# Patient Record
Sex: FEMALE | Race: WHITE | NOT HISPANIC OR LATINO | Employment: UNEMPLOYED | URBAN - NONMETROPOLITAN AREA
[De-identification: names, ages, dates, MRNs, and addresses within clinical notes are randomized per-mention and may not be internally consistent; named-entity substitution may affect disease eponyms.]

---

## 2017-11-01 ENCOUNTER — TRANSCRIBE ORDERS (OUTPATIENT)
Dept: ADMINISTRATIVE | Facility: HOSPITAL | Age: 37
End: 2017-11-01

## 2017-11-01 DIAGNOSIS — Z12.31 VISIT FOR SCREENING MAMMOGRAM: Primary | ICD-10-CM

## 2017-11-02 ENCOUNTER — HOSPITAL ENCOUNTER (OUTPATIENT)
Dept: MAMMOGRAPHY | Facility: HOSPITAL | Age: 37
Discharge: HOME | End: 2017-11-02
Admitting: PHYSICIAN ASSISTANT

## 2017-11-02 DIAGNOSIS — Z12.31 VISIT FOR SCREENING MAMMOGRAM: ICD-10-CM

## 2017-11-02 PROCEDURE — 77067 SCR MAMMO BI INCL CAD: CPT | Performed by: RADIOLOGY

## 2017-11-02 PROCEDURE — G0202 SCR MAMMO BI INCL CAD: HCPCS

## 2017-11-02 PROCEDURE — 77063 BREAST TOMOSYNTHESIS BI: CPT | Performed by: RADIOLOGY

## 2017-11-02 PROCEDURE — 77063 BREAST TOMOSYNTHESIS BI: CPT

## 2018-02-27 ENCOUNTER — HOSPITAL ENCOUNTER (OUTPATIENT)
Dept: CARDIOLOGY | Facility: HOSPITAL | Age: 38
Discharge: HOME | End: 2018-02-27

## 2018-02-27 ENCOUNTER — HOSPITAL ENCOUNTER (OUTPATIENT)
Dept: CARDIOLOGY | Facility: HOSPITAL | Age: 38
Discharge: HOME | End: 2018-02-27
Admitting: NURSE PRACTITIONER

## 2018-02-27 ENCOUNTER — OFFICE VISIT (OUTPATIENT)
Dept: CARDIOLOGY | Facility: HOSPITAL | Age: 38
End: 2018-02-27

## 2018-02-27 VITALS
OXYGEN SATURATION: 100 % | BODY MASS INDEX: 37.18 KG/M2 | RESPIRATION RATE: 19 BRPM | HEART RATE: 76 BPM | WEIGHT: 251 LBS | DIASTOLIC BLOOD PRESSURE: 73 MMHG | HEIGHT: 69 IN | TEMPERATURE: 97.7 F | SYSTOLIC BLOOD PRESSURE: 122 MMHG

## 2018-02-27 DIAGNOSIS — R55 SYNCOPE, UNSPECIFIED SYNCOPE TYPE: ICD-10-CM

## 2018-02-27 DIAGNOSIS — R55 SYNCOPE, UNSPECIFIED SYNCOPE TYPE: Primary | ICD-10-CM

## 2018-02-27 DIAGNOSIS — R00.2 PALPITATION: ICD-10-CM

## 2018-02-27 DIAGNOSIS — R42 DIZZINESS: ICD-10-CM

## 2018-02-27 PROBLEM — K21.9 GERD (GASTROESOPHAGEAL REFLUX DISEASE): Status: ACTIVE | Noted: 2018-02-27

## 2018-02-27 PROBLEM — D17.9 LIPOMA: Status: ACTIVE | Noted: 2018-02-27

## 2018-02-27 PROBLEM — F41.9 ANXIETY: Status: ACTIVE | Noted: 2018-02-27

## 2018-02-27 PROBLEM — I49.3 PVC'S (PREMATURE VENTRICULAR CONTRACTIONS): Status: ACTIVE | Noted: 2018-02-27

## 2018-02-27 PROBLEM — E03.9 ACQUIRED HYPOTHYROIDISM: Status: ACTIVE | Noted: 2018-02-27

## 2018-02-27 PROBLEM — E78.2 MIXED HYPERLIPIDEMIA: Status: ACTIVE | Noted: 2018-02-27

## 2018-02-27 PROCEDURE — 93005 ELECTROCARDIOGRAM TRACING: CPT | Performed by: NURSE PRACTITIONER

## 2018-02-27 PROCEDURE — 99214 OFFICE O/P EST MOD 30 MIN: CPT | Performed by: NURSE PRACTITIONER

## 2018-02-27 PROCEDURE — 93270 REMOTE 30 DAY ECG REV/REPORT: CPT

## 2018-02-27 PROCEDURE — 93010 ELECTROCARDIOGRAM REPORT: CPT | Performed by: INTERNAL MEDICINE

## 2018-02-27 RX ORDER — VILAZODONE HYDROCHLORIDE 20 MG/1
20 TABLET ORAL DAILY
COMMUNITY
Start: 2018-02-08 | End: 2019-04-15

## 2018-02-27 RX ORDER — ATENOLOL 25 MG/1
12.5 TABLET ORAL DAILY
COMMUNITY
Start: 2018-02-02 | End: 2018-04-12

## 2018-02-27 RX ORDER — HYDROXYZINE PAMOATE 25 MG/1
25 CAPSULE ORAL EVERY 6 HOURS
COMMUNITY
Start: 2018-02-02 | End: 2019-04-15

## 2018-02-27 RX ORDER — IMIPRAMINE HYDROCHLORIDE 10 MG/1
10 TABLET, FILM COATED ORAL 2 TIMES DAILY
COMMUNITY
Start: 2018-02-02

## 2018-02-27 RX ORDER — PANTOPRAZOLE SODIUM 40 MG/1
40 TABLET, DELAYED RELEASE ORAL DAILY
COMMUNITY
Start: 2018-02-02 | End: 2018-04-30

## 2018-02-27 RX ORDER — LEVOTHYROXINE SODIUM 0.05 MG/1
50 TABLET ORAL DAILY
COMMUNITY
Start: 2018-02-07

## 2018-02-27 RX ORDER — ATORVASTATIN CALCIUM 20 MG/1
20 TABLET, FILM COATED ORAL DAILY
COMMUNITY
Start: 2018-02-02

## 2018-02-27 RX ORDER — GABAPENTIN 600 MG/1
800 TABLET ORAL 3 TIMES DAILY
COMMUNITY
Start: 2017-12-30

## 2018-02-27 NOTE — PROGRESS NOTES
Encounter Date:02/27/2018      Patient ID: Vidhya Park is a 37 y.o. female.        Subjective:     Chief Complaint: Establish Care   palpitations, syncope    History of Present Illness patient [facility] today for ongoing evaluation of her recent syncope.She notes that she had a tilt table a few years ago, data deficient. She notes that she did pass out during the test. She notes that she was started on atenolol by Dr Crowley at that time. She notes she has experienced intermittent palpitations since age 11. She reports an echo and holter from 2 years ago, data deficient. She notes generalized weakness and intermittent right sided chest pain. She notes that the chest pain only occurs on her right side and it does not radiate. She also reports dyspnea on exertion that improves with rest especially when walking on uneven ground.   Patient notes that she experienced a syncopal spell roughly 2 weeks ago. She notes that she was sitting at the table and began to experience nausea. She notes that she stepped outside to get some air and notes that she passed out. Per her , he found her lying on the porch and notes that it had been less than 1 minute from the time she walked outside. She notes associated blurry vision, diaphoresis, and tinnitus prior to her syncopal spell.   She also reports an increase in palpitations but reports she was started on synthroid roughly one month ago.   She also reports unilateral swelling in right arm and right leg that has present intermittently for the last year. She notes that her right arm and leg will swell when she is up frequently and then it improves with rest. She denies any injury or trauma to the right arm or leg.   She reports that her blood pressure usually runs 90/78.   She reports dizziness with position changes or when she has been standing for long periods.    Patient Active Problem List   Diagnosis   • Palpitation   • Dizziness   •  Syncope   • Acquired hypothyroidism   • Lipoma   • Anxiety   • GERD (gastroesophageal reflux disease)   • PVC's (premature ventricular contractions)   • Mixed hyperlipidemia     Past Surgical History:   Procedure Laterality Date   • CHOLECYSTECTOMY     • CYST REMOVAL      back,   • HYSTERECTOMY         No Known Allergies      Current Outpatient Prescriptions:   •  atenolol (TENORMIN) 25 MG tablet, Take 25 mg by mouth Daily., Disp: , Rfl:   •  atorvastatin (LIPITOR) 20 MG tablet, Take 20 mg by mouth Daily., Disp: , Rfl:   •  gabapentin (NEURONTIN) 600 MG tablet, Take 600 mg by mouth 3 (Three) Times a Day., Disp: , Rfl:   •  hydrOXYzine (VISTARIL) 25 MG capsule, Take 25 mg by mouth Every 6 (Six) Hours., Disp: , Rfl:   •  imipramine (TOFRANIL) 10 MG tablet, Take 10 mg by mouth 2 (Two) Times a Day., Disp: , Rfl:   •  levothyroxine (SYNTHROID, LEVOTHROID) 50 MCG tablet, Take 50 mcg by mouth Daily., Disp: , Rfl:   •  pantoprazole (PROTONIX) 40 MG EC tablet, Take 40 mg by mouth Daily., Disp: , Rfl:   •  VIIBRYD 20 MG tablet tablet, Take 20 mg by mouth Daily., Disp: , Rfl:     The following portions of the chart were reviewed and updated as appropriate: Allergies, current medications, past family history, social history, past medical history.     Review of Systems   Constitution: Positive for weakness and weight gain. Negative for chills, decreased appetite, diaphoresis, fever, malaise/fatigue, night sweats and weight loss.   HENT: Negative for congestion, hearing loss, hoarse voice and nosebleeds.    Eyes: Negative for blurred vision, visual disturbance and visual halos.   Cardiovascular: Positive for chest pain, dyspnea on exertion, leg swelling, palpitations and syncope. Negative for claudication, cyanosis, irregular heartbeat, near-syncope, orthopnea and paroxysmal nocturnal dyspnea.   Respiratory: Positive for shortness of breath and sleep disturbances due to breathing. Negative for cough, hemoptysis, snoring, sputum  "production and wheezing.    Hematologic/Lymphatic: Negative for bleeding problem. Does not bruise/bleed easily.   Skin: Negative for dry skin, itching and rash.   Musculoskeletal: Negative for arthritis, joint pain, joint swelling and myalgias.   Gastrointestinal: Positive for heartburn and nausea. Negative for bloating, abdominal pain, constipation, diarrhea, flatus, hematemesis, hematochezia, melena and vomiting.   Genitourinary: Negative for dysuria, frequency, hematuria, nocturia and urgency.   Neurological: Positive for dizziness and headaches. Negative for excessive daytime sleepiness, light-headedness and loss of balance.   Psychiatric/Behavioral: Negative for depression. The patient does not have insomnia and is not nervous/anxious.            Objective:     Vitals:    02/27/18 1008 02/27/18 1011 02/27/18 1012   BP: 117/70 115/75 122/73   BP Location: Right arm Left arm Left arm   Patient Position: Sitting Sitting Standing   Cuff Size: Adult     Pulse: 69 76 76   Resp: 19     Temp: 97.7 °F (36.5 °C)     TempSrc: Temporal Artery      SpO2: 100%     Weight: 114 kg (251 lb)     Height: 175.3 cm (69\")           Physical Exam   Constitutional: She is oriented to person, place, and time. She appears well-developed and well-nourished. She is active and cooperative. No distress.   HENT:   Head: Normocephalic and atraumatic.   Mouth/Throat: Oropharynx is clear and moist.   Eyes: Conjunctivae and EOM are normal. Pupils are equal, round, and reactive to light.   Neck: Normal range of motion. Neck supple. No JVD present. No tracheal deviation present. No thyromegaly present.   Cardiovascular: Normal rate, regular rhythm, normal heart sounds and intact distal pulses.    Pulmonary/Chest: Effort normal and breath sounds normal.   Abdominal: Soft. Bowel sounds are normal. She exhibits no distension. There is no tenderness.   Musculoskeletal: Normal range of motion.   Neurological: She is alert and oriented to person, place, " and time.   Skin: Skin is warm, dry and intact.   Psychiatric: She has a normal mood and affect. Her behavior is normal.   Nursing note and vitals reviewed.      Lab and Diagnostic Review:      EKG today: NSR at 72 bpm, nonspecific T wave abnormality  1/31/2018: WBC 10.6, RBC 4.52, hemoglobin 12.4, hematocrit 39.2, platelets 256, glucose 85, BUN 17, creatinine 0.69, estimated , sodium 138, potassium 4.7, chloride 98, carbon dioxide 22, calcium 9.6, total protein 7.1, albumin 4.0, total bilirubin 0.7, alkaline phosphatase 118, AST 27, ALT 15, total cholesterol 201, triglycerides 276, HDL 41, , TSH 6.630, vitamin d 23.5    Assessment and Plan:         1. Syncope, unspecified syncope type  Will request tilt, echo and holter from 2 years ago from Dr Crowley  - Cardiac Event Monitor; Future  Will only order new echo if abnormalities noted  2. Palpitation  Continue atenolol   - ECG 12 Lead; Future  - Cardiac Event Monitor; Future    3. Dizziness    - Cardiac Event Monitor; Future    Further treatment plan pending results of event monitor  Will request records from Dr Crowley from 2 years ago    It has been a pleasure to participate in the care of this patient.  Patient was instructed to call the [facility] with any questions, concerns, or worsening symptoms.    * Please note that portions of this note were completed with a voice recognition program. Efforts were made to edit the dictation but occasionally words are transcribed.

## 2018-04-12 ENCOUNTER — OFFICE VISIT (OUTPATIENT)
Dept: CARDIOLOGY | Facility: HOSPITAL | Age: 38
End: 2018-04-12

## 2018-04-12 ENCOUNTER — HOSPITAL ENCOUNTER (OUTPATIENT)
Dept: CARDIOLOGY | Facility: HOSPITAL | Age: 38
Discharge: HOME | End: 2018-04-12
Admitting: NURSE PRACTITIONER

## 2018-04-12 VITALS
DIASTOLIC BLOOD PRESSURE: 78 MMHG | HEIGHT: 69 IN | BODY MASS INDEX: 37.5 KG/M2 | TEMPERATURE: 98 F | RESPIRATION RATE: 16 BRPM | WEIGHT: 253.2 LBS | SYSTOLIC BLOOD PRESSURE: 114 MMHG | HEART RATE: 89 BPM | OXYGEN SATURATION: 100 %

## 2018-04-12 DIAGNOSIS — R55 SYNCOPE, UNSPECIFIED SYNCOPE TYPE: ICD-10-CM

## 2018-04-12 DIAGNOSIS — R00.0 SINUS TACHYCARDIA: ICD-10-CM

## 2018-04-12 DIAGNOSIS — R09.89 LABILE BLOOD PRESSURE: Primary | ICD-10-CM

## 2018-04-12 DIAGNOSIS — R00.2 PALPITATIONS: ICD-10-CM

## 2018-04-12 PROCEDURE — 93786 AMBL BP MNTR W/SW REC ONLY: CPT

## 2018-04-12 PROCEDURE — 99214 OFFICE O/P EST MOD 30 MIN: CPT | Performed by: NURSE PRACTITIONER

## 2018-04-12 RX ORDER — BISOPROLOL FUMARATE 5 MG/1
TABLET, FILM COATED ORAL
Qty: 30 TABLET | Refills: 5 | Status: SHIPPED | OUTPATIENT
Start: 2018-04-12 | End: 2018-04-30

## 2018-04-12 NOTE — PROGRESS NOTES
Encounter Date:04/12/2018      Patient ID: Vidhya Park is a 37 y.o. female.        Subjective:     Chief Complaint: Follow-up   syncope  History of Present Illness patient presents to the office today for ongoing evaluation of her syncope and palpitations. Patient no further syncopal spells since last office visit at the end of February. She notes that she experiences presyncope multiple times a day. She notes that her PCP recently cut her atenolol in half due to low blood pressures. She notes that her blood pressure can be 130/110 and then an hour later it will be 90/62. A tilt table April 2016 was negative. No hx of echo or stress.  She notes ongoing weakness, fatigue, dyspnea and intermittent pedal edema.   Patient Active Problem List   Diagnosis   • Palpitation   • Dizziness   • Syncope   • Acquired hypothyroidism   • Lipoma   • Anxiety   • GERD (gastroesophageal reflux disease)   • PVC's (premature ventricular contractions)   • Mixed hyperlipidemia       Past Surgical History:   Procedure Laterality Date   • CHOLECYSTECTOMY     • CYST REMOVAL      back,   • HYSTERECTOMY         No Known Allergies      Current Outpatient Prescriptions:   •  atorvastatin (LIPITOR) 20 MG tablet, Take 20 mg by mouth Daily., Disp: , Rfl:   •  gabapentin (NEURONTIN) 600 MG tablet, Take 600 mg by mouth 3 (Three) Times a Day., Disp: , Rfl:   •  hydrOXYzine (VISTARIL) 25 MG capsule, Take 25 mg by mouth Every 6 (Six) Hours., Disp: , Rfl:   •  imipramine (TOFRANIL) 10 MG tablet, Take 10 mg by mouth 2 (Two) Times a Day., Disp: , Rfl:   •  levothyroxine (SYNTHROID, LEVOTHROID) 50 MCG tablet, Take 50 mcg by mouth Daily., Disp: , Rfl:   •  pantoprazole (PROTONIX) 40 MG EC tablet, Take 40 mg by mouth Daily., Disp: , Rfl:   •  VIIBRYD 20 MG tablet tablet, Take 20 mg by mouth Daily., Disp: , Rfl:   •  bisoprolol (ZEBeta) 5 MG tablet, 1/2 tab po qd, Disp: 30 tablet, Rfl: 5    The following portions of the chart were reviewed and updated as  "appropriate: Allergies, current medications, past family history, social history, past medical history.     Review of Systems   Constitution: Positive for weakness, malaise/fatigue and weight gain. Negative for chills, decreased appetite, diaphoresis, fever, night sweats and weight loss.   HENT: Negative for congestion, hearing loss, hoarse voice and nosebleeds.    Eyes: Negative for blurred vision, visual disturbance and visual halos.   Cardiovascular: Positive for dyspnea on exertion, leg swelling and near-syncope. Negative for chest pain, claudication, cyanosis, irregular heartbeat, orthopnea, palpitations, paroxysmal nocturnal dyspnea and syncope.   Respiratory: Positive for shortness of breath and snoring. Negative for cough, hemoptysis, sleep disturbances due to breathing, sputum production and wheezing.    Hematologic/Lymphatic: Negative for bleeding problem. Does not bruise/bleed easily.   Skin: Negative for dry skin, itching and rash.   Musculoskeletal: Positive for joint pain, muscle cramps and muscle weakness. Negative for arthritis, falls, joint swelling and myalgias.   Gastrointestinal: Positive for heartburn and nausea. Negative for bloating, abdominal pain, constipation, diarrhea, flatus, hematemesis, hematochezia, melena and vomiting.   Genitourinary: Negative for dysuria, frequency, hematuria, nocturia and urgency.   Neurological: Positive for excessive daytime sleepiness, dizziness, headaches, light-headedness and loss of balance.   Psychiatric/Behavioral: Positive for depression. The patient has insomnia and is nervous/anxious.            Objective:     Vitals:    04/12/18 1102 04/12/18 1103 04/12/18 1104   BP: 117/73 111/71 114/78   BP Location: Right arm Left arm Left arm   Patient Position: Sitting Sitting Standing   Pulse: 75  89   Resp: 16     Temp: 98 °F (36.7 °C)     TempSrc: Temporal Artery      SpO2: 100%     Weight: 115 kg (253 lb 3.2 oz)     Height: 175.3 cm (69.02\")           Physical " Exam   Constitutional: She is oriented to person, place, and time. She appears well-developed and well-nourished. She is active and cooperative. No distress.   HENT:   Head: Normocephalic and atraumatic.   Mouth/Throat: Oropharynx is clear and moist.   Eyes: Conjunctivae and EOM are normal. Pupils are equal, round, and reactive to light.   Neck: Normal range of motion. Neck supple. No JVD present. No tracheal deviation present. No thyromegaly present.   Cardiovascular: Normal rate, regular rhythm, normal heart sounds and intact distal pulses.    Pulmonary/Chest: Effort normal and breath sounds normal.   Abdominal: Soft. Bowel sounds are normal. She exhibits no distension. There is no tenderness.   Musculoskeletal: Normal range of motion.   Neurological: She is alert and oriented to person, place, and time.   Skin: Skin is warm, dry and intact.   Psychiatric: She has a normal mood and affect. Her behavior is normal.   Nursing note and vitals reviewed.      Lab and Diagnostic Review:      [facility]: April 2016 WNL  30 day event monitor : worn for 24 days, PAC/PVC burden less than 1%  Average hr 78 bpm, maximum 157 bpm (sinus tachycardia)    Assessment and Plan:         1. Labile blood pressure  Patient has been having hypotension even on atenolol 12.5mg once a day   Begin bisoprolol 2. 5mg once a day  - 24 Hour Blood Pressure Monitor  - Ambulatory Referral to Cardiology Dr Thomas in [place]    2. Palpitations    - Adult Transthoracic Echo Complete W/ Cont if Necessary Per Protocol; Future  - Ambulatory Referral to Cardiology    3. Syncope, unspecified syncope type  Suspect orthostatic hypotension   - Adult Transthoracic Echo Complete W/ Cont if Necessary Per Protocol; Future  - Ambulatory Referral to Cardiology  4. Sinus tachycardia  Recent event showed no arrhthymias but hr as high as 157 in absence of exercise  D/c atenolol due to episodic hypotension  Begin bisoprolol 2. 5mg once daily    It has been a  pleasure to participate in the care of this patient.  Patient was instructed to call the [facility] with any questions, concerns, or worsening symptoms.  * Please note that portions of this note were completed with a voice recognition program. Efforts were made to edit the dictation but occasionally words are transcribed.

## 2018-04-12 NOTE — PATIENT INSTRUCTIONS
Please wear bp monitor for 24 hours  Start bisoprolol 2. 5 mg once a day in place of atenolol  You will be called with your appt for Cardiology and your echo

## 2018-04-26 ENCOUNTER — HOSPITAL ENCOUNTER (OUTPATIENT)
Dept: CARDIOLOGY | Facility: HOSPITAL | Age: 38
Discharge: HOME | End: 2018-04-26
Admitting: NURSE PRACTITIONER

## 2018-04-26 DIAGNOSIS — R00.2 PALPITATIONS: ICD-10-CM

## 2018-04-26 DIAGNOSIS — R55 SYNCOPE, UNSPECIFIED SYNCOPE TYPE: ICD-10-CM

## 2018-04-26 PROCEDURE — 93306 TTE W/DOPPLER COMPLETE: CPT | Performed by: INTERNAL MEDICINE

## 2018-04-26 PROCEDURE — 93306 TTE W/DOPPLER COMPLETE: CPT

## 2018-04-27 LAB
BH CV ECHO MEAS - % IVS THICK: -16.1 %
BH CV ECHO MEAS - % LVPW THICK: 23 %
BH CV ECHO MEAS - ACS: 2.1 CM
BH CV ECHO MEAS - AO MAX PG: 6 MMHG
BH CV ECHO MEAS - AO MEAN PG: 2.9 MMHG
BH CV ECHO MEAS - AO ROOT AREA (BSA CORRECTED): 1.4
BH CV ECHO MEAS - AO ROOT AREA: 7.8 CM^2
BH CV ECHO MEAS - AO ROOT DIAM: 3.2 CM
BH CV ECHO MEAS - AO V2 MAX: 122.2 CM/SEC
BH CV ECHO MEAS - AO V2 MEAN: 74.8 CM/SEC
BH CV ECHO MEAS - AO V2 VTI: 24 CM
BH CV ECHO MEAS - BSA(HAYCOCK): 2.4 M^2
BH CV ECHO MEAS - BSA: 2.3 M^2
BH CV ECHO MEAS - BZI_BMI: 37.4 KILOGRAMS/M^2
BH CV ECHO MEAS - BZI_METRIC_HEIGHT: 175.3 CM
BH CV ECHO MEAS - BZI_METRIC_WEIGHT: 114.8 KG
BH CV ECHO MEAS - CONTRAST EF 4CH: 68.2 ML/M^2
BH CV ECHO MEAS - EDV(CUBED): 59.5 ML
BH CV ECHO MEAS - EDV(MOD-SP4): 44 ML
BH CV ECHO MEAS - EDV(TEICH): 66.1 ML
BH CV ECHO MEAS - EF(CUBED): 76.4 %
BH CV ECHO MEAS - EF(MOD-SP4): 68.2 %
BH CV ECHO MEAS - EF(TEICH): 69.1 %
BH CV ECHO MEAS - ESV(CUBED): 14 ML
BH CV ECHO MEAS - ESV(MOD-SP4): 14 ML
BH CV ECHO MEAS - ESV(TEICH): 20.4 ML
BH CV ECHO MEAS - FS: 38.2 %
BH CV ECHO MEAS - IVS/LVPW: 1.2
BH CV ECHO MEAS - IVSD: 1.3 CM
BH CV ECHO MEAS - IVSS: 1.1 CM
BH CV ECHO MEAS - LA DIMENSION: 3 CM
BH CV ECHO MEAS - LA/AO: 0.94
BH CV ECHO MEAS - LV DIASTOLIC VOL/BSA (35-75): 19.3 ML/M^2
BH CV ECHO MEAS - LV MASS(C)D: 158 GRAMS
BH CV ECHO MEAS - LV MASS(C)DI: 69.2 GRAMS/M^2
BH CV ECHO MEAS - LV MASS(C)S: 82.8 GRAMS
BH CV ECHO MEAS - LV MASS(C)SI: 36.3 GRAMS/M^2
BH CV ECHO MEAS - LV SYSTOLIC VOL/BSA (12-30): 6.1 ML/M^2
BH CV ECHO MEAS - LVIDD: 3.9 CM
BH CV ECHO MEAS - LVIDS: 2.4 CM
BH CV ECHO MEAS - LVLD AP4: 6.9 CM
BH CV ECHO MEAS - LVLS AP4: 5.2 CM
BH CV ECHO MEAS - LVOT AREA (M): 3.5 CM^2
BH CV ECHO MEAS - LVOT AREA: 3.4 CM^2
BH CV ECHO MEAS - LVOT DIAM: 2.1 CM
BH CV ECHO MEAS - LVPWD: 1.1 CM
BH CV ECHO MEAS - LVPWS: 1.3 CM
BH CV ECHO MEAS - MV A MAX VEL: 58.2 CM/SEC
BH CV ECHO MEAS - MV E MAX VEL: 59.2 CM/SEC
BH CV ECHO MEAS - MV E/A: 1
BH CV ECHO MEAS - PA ACC SLOPE: 808.6 CM/SEC^2
BH CV ECHO MEAS - PA ACC TIME: 0.14 SEC
BH CV ECHO MEAS - PA PR(ACCEL): 14 MMHG
BH CV ECHO MEAS - RVDD: 0.73 CM
BH CV ECHO MEAS - SI(AO): 82 ML/M^2
BH CV ECHO MEAS - SI(CUBED): 19.9 ML/M^2
BH CV ECHO MEAS - SI(MOD-SP4): 13.1 ML/M^2
BH CV ECHO MEAS - SI(TEICH): 20 ML/M^2
BH CV ECHO MEAS - SV(AO): 187.2 ML
BH CV ECHO MEAS - SV(CUBED): 45.5 ML
BH CV ECHO MEAS - SV(MOD-SP4): 30 ML
BH CV ECHO MEAS - SV(TEICH): 45.7 ML

## 2018-04-30 ENCOUNTER — CONSULT (OUTPATIENT)
Dept: CARDIOLOGY | Facility: CLINIC | Age: 38
End: 2018-04-30

## 2018-04-30 VITALS
WEIGHT: 250.8 LBS | DIASTOLIC BLOOD PRESSURE: 72 MMHG | HEART RATE: 90 BPM | SYSTOLIC BLOOD PRESSURE: 118 MMHG | HEIGHT: 68 IN | BODY MASS INDEX: 38.01 KG/M2

## 2018-04-30 DIAGNOSIS — E78.5 DYSLIPIDEMIA: ICD-10-CM

## 2018-04-30 DIAGNOSIS — R55 VASOVAGAL SYNCOPE: ICD-10-CM

## 2018-04-30 DIAGNOSIS — R07.89 OTHER CHEST PAIN: ICD-10-CM

## 2018-04-30 DIAGNOSIS — R06.00 PND (PAROXYSMAL NOCTURNAL DYSPNEA): ICD-10-CM

## 2018-04-30 DIAGNOSIS — R00.2 PALPITATIONS: Primary | ICD-10-CM

## 2018-04-30 PROCEDURE — 99244 OFF/OP CNSLTJ NEW/EST MOD 40: CPT | Performed by: INTERNAL MEDICINE

## 2018-04-30 RX ORDER — CARVEDILOL 3.12 MG/1
3.12 TABLET ORAL 2 TIMES DAILY WITH MEALS
COMMUNITY
End: 2019-04-15

## 2018-04-30 NOTE — PROGRESS NOTES
[facility] at [facility]  Consultation H&P  Vidhya Park  1980  [phone]    VISIT DATE:  18    PCP: Jefferson Patino, PA  [address] [place] [address]    IDENTIFICATION: A 37 y.o. female from [place], [place]    CC:  Chief Complaint   Patient presents with   • Chest Pain   • Shortness of Breath   • Palpitations       PROBLEM LIST:  1. Palpitations  1. 18 event monitor: no arrhythmias but some sinus tach 157 bpm not during exertion  2. 18 echo: EF 61-65%, normal valvular structure and function  2. Syncope  1. 2016 tilt table neg  3. Labile BP  1. 18 24 BP monitor: max 131/88 min 90/43 ave 110/69, HR max 103 min 63 ave 84,  4. HLD, on atorvastatin  5. Hypothyroidism  6. GERD  7. Anxiety  8. , nl pregnancies   9. Surgical Hx:  1. Cholecystectomy  2. Hysterectomy  3. Cyst removal from back    Allergies  No Known Allergies    Current Medications    Current Outpatient Prescriptions:   •  atorvastatin (LIPITOR) 20 MG tablet, Take 20 mg by mouth Daily., Disp: , Rfl:   •  carvedilol (COREG) 3.125 MG tablet, Take 3.125 mg by mouth 2 (Two) Times a Day With Meals., Disp: , Rfl:   •  Dexlansoprazole (DEXILANT PO), Take  by mouth., Disp: , Rfl:   •  gabapentin (NEURONTIN) 600 MG tablet, Take 600 mg by mouth 3 (Three) Times a Day., Disp: , Rfl:   •  hydrOXYzine (VISTARIL) 25 MG capsule, Take 25 mg by mouth Every 6 (Six) Hours., Disp: , Rfl:   •  IBUPROFEN PO, Take  by mouth., Disp: , Rfl:   •  imipramine (TOFRANIL) 10 MG tablet, Take 10 mg by mouth 2 (Two) Times a Day., Disp: , Rfl:   •  levothyroxine (SYNTHROID, LEVOTHROID) 50 MCG tablet, Take 50 mcg by mouth Daily., Disp: , Rfl:   •  VIIBRYD 20 MG tablet tablet, Take 20 mg by mouth Daily., Disp: , Rfl:      History of Present Illness   HPI  This is a 37 year old female w the above mentioned PMH who presents for consult from [facility] for evaluation of palpitations, labile BP, and syncope. She has had  "recent testing for such as detailed in the above problem list.     Pt reports a long standing history of having \"a messed up heart where no one can find what's wrong\" where's she's had recurrent syncope and palpitations. She was placed on atenolol a few years ago and had been functional until the last several months, at which time she's started having chest pains, dyspnea on exertion, worsened dizzy spells, and worsened palpitations. CP is described as pressure or sharp stabbing pains, and are random in onset. The dyspnea is also random in onset. She gets presyncopal with exertion or emotional stress, and has noticed that position changes will bring on presyncope spells. When she feels presyncopal she can lay down and avoid syncope. Palpitations occur more with exertion. Her symptoms vary in frequency from every 1-2 days to multiple times a day.      She was switched from atenolol to zebeta, but started having worse BP issues. She is now on coreg, and is feeling slightly better. She had been on toprol in the past as well but had hypotension.     Additionally, she states sometimes she will be walking and her legs have \"felt like jello\" and she can't ambulate for anywhere from several minutes to a day. Her sister in law is a nurse practitioner and has suggest that the patient may have MS. Neuro consult is being pursued per her PCP.    Pt denies any dyspnea at rest,  orthopnea, lower extremity edema, or claudication. Pt denies history of CHF, DVT, PE, MI, CVA, TIA, or rheumatic fever. Pt does drink a lot of soda, and historically did not drink much water, but is trying to increase her water intake.     ROS  Review of Systems   Constitution: Positive for malaise/fatigue.   Eyes: Positive for blurred vision.   Cardiovascular: Positive for chest pain and palpitations.   Respiratory: Positive for shortness of breath.    Musculoskeletal: Positive for muscle weakness and myalgias.   Gastrointestinal: Positive for heartburn. " "  Neurological: Positive for excessive daytime sleepiness, dizziness, headaches and light-headedness.   Psychiatric/Behavioral: Positive for depression. The patient is nervous/anxious.    All other systems reviewed and are negative.      SOCIAL HX  Social History     Social History   • Marital status:      Spouse name: N/A   • Number of children: N/A   • Years of education: N/A     Occupational History   • Not on file.     Social History Main Topics   • Smoking status: Never Smoker   • Smokeless tobacco: Never Used   • Alcohol use No   • Drug use: No   • Sexual activity: Defer     Other Topics Concern   • Not on file     Social History Narrative    Caffeine: 4-5 servings per day    Patient live a t home wit her  and kids       FAMILY HX  Family History   Problem Relation Age of Onset   • Breast cancer Maternal Grandmother    • Breast cancer Maternal Aunt    • Breast cancer Maternal Aunt    • Heart disease Maternal Aunt    • Cancer Mother    • Thyroid disease Mother    • Heart disease Father    • No Known Problems Sister    • Hypertension Brother    • Hyperlipidemia Brother    • Stroke Maternal Grandfather    • Hyperlipidemia Maternal Grandfather    • Diabetes Maternal Grandfather    • Heart disease Maternal Grandfather        Vitals:    04/30/18 1400   BP: 118/72   BP Location: Right arm   Patient Position: Sitting   Pulse: 90   Weight: 114 kg (250 lb 12.8 oz)   Height: 172.7 cm (68\")       PHYSICAL EXAMINATION:  Physical Exam   Constitutional: She is oriented to person, place, and time. She appears well-developed and well-nourished. No distress.   obese   HENT:   Head: Normocephalic and atraumatic.   Right Ear: External ear normal.   Left Ear: External ear normal.   Nose: Nose normal.   Eyes: Conjunctivae and EOM are normal.   Neck: Neck supple. No hepatojugular reflux and no JVD present. Carotid bruit is not present. No thyromegaly present.   Cardiovascular: Normal rate, regular rhythm, S1 normal, S2 " normal, normal heart sounds, intact distal pulses and normal pulses.  Exam reveals no gallop, no distant heart sounds and no midsystolic click.    No murmur heard.  Pulses:       Radial pulses are 2+ on the right side, and 2+ on the left side.        Dorsalis pedis pulses are 2+ on the right side, and 2+ on the left side.        Posterior tibial pulses are 2+ on the right side, and 2+ on the left side.   Pulmonary/Chest: Effort normal and breath sounds normal. No respiratory distress. She has no decreased breath sounds. She has no wheezes. She has no rhonchi. She has no rales.   Abdominal: Soft. Bowel sounds are normal. There is no hepatosplenomegaly. There is no tenderness.   Musculoskeletal: Normal range of motion. She exhibits no edema.   Neurological: She is alert and oriented to person, place, and time.   No focal deficits.   Skin: Skin is warm and dry. No erythema.   Psychiatric: She has a normal mood and affect. Thought content normal.   Nursing note and vitals reviewed.      Diagnostic Data:  Procedures     ASSESSMENT:   Diagnosis Plan   1. Palpitations  Treadmill Stress Test   2. Vasovagal syncope  Treadmill Stress Test   3. Dyslipidemia     4. PND (paroxysmal nocturnal dyspnea)  Overnight Sleep Oximetry Study   5. Other chest pain  Treadmill Stress Test       PLAN:  1. With exertional symptoms, we will evaluate BP, HR response to exercise with treadmill stress test. We will want pt to stay on her coreg for this as we are not evaluating for ischemia, but to evaluate her heart's response to adrenaline.    2. Syncope/presyncopal episodes sound at least partially vasovagal in nature.  Handout given to patient outlining prevention.  Patient counseled to stay hydrated, limit soda intake, avoid sudden position changes.  3. Continue statin therapy, labs per PCP  4. With symptoms we will screen for FANTA with overnight pulse ox monitoring  5. Discussed with pt that her symptoms could be drug side effects from   Imipramine, which has common side effects of syncope, hypotension, palpitations, arrhythmias.    RTC 6-9 months, sooner if needed    Scribed for Bjorn Thomas MD by Anna Alonso PA-C. 4/30/2018  3:26 PM  I, Bjorn Thomas MD, personally performed the services described in this documentation as scribed by the above named individual in my presence, and it is both accurate and complete.  4/30/2018  3:26 PM    Bjorn Thomas MD, FACC

## 2018-05-04 PROCEDURE — 93272 ECG/REVIEW INTERPRET ONLY: CPT | Performed by: INTERNAL MEDICINE

## 2018-05-23 LAB — TOAL ENROLLMENT DAYS: 30

## 2019-04-11 NOTE — PROGRESS NOTES
[facility]  Office Progress Note  Vidhya Park  1980        Visit Date: 04/15/2019    PCP: Jefferson Patino PA-C  [address] [place] [address]    IDENTIFICATION: A 38 y.o. female resident of [place], [place].     Chief Complaint: Palpitations, pre-syncope,labile B/P    PROBLEM LIST:     1. Palpitations  1. 18 event monitor: no arrhythmias but some sinus tach 157 bpm pt stated not during exertion  2. 18 echo: EF 61-65%, normal valvular structure and function  2. Syncope  1. 2016 tilt table neg  3. Labile BP  1. 18 24 BP monitor: max 131/88 min 90/43 ave 110/69, HR max 103 min 63 ave 84,  4. HLD, on atorvastatin  5. Undescribed neurologic sxs-to have evaluation at [place] 2019 MS /hydrocephalus ? Pt seeing multiple neurologists  6. Hypothyroidism  7. GERD  8. Anxiety  9. , nl pregnancies   10. Surgical Hx:  1. Cholecystectomy  2. Hysterectomy  3. Cyst removal from back    Allergies  No Known Allergies    Current Medications    Current Outpatient Medications:   •  atorvastatin (LIPITOR) 20 MG tablet, Take 20 mg by mouth Daily., Disp: , Rfl:   •  baclofen (LIORESAL) 10 MG tablet, Take 10 mg by mouth 2 (Two) Times a Day., Disp: , Rfl:   •  DULoxetine (CYMBALTA) 30 MG capsule, Take 30 mg by mouth 2 (Two) Times a Day., Disp: , Rfl:   •  ESOMEPRAZOLE MAGNESIUM PO, Take 40 mg by mouth Daily., Disp: , Rfl:   •  FLUoxetine (PROzac) 20 MG capsule, Take 20 mg by mouth Every Morning., Disp: , Rfl: 1  •  gabapentin (NEURONTIN) 600 MG tablet, Take 800 mg by mouth 3 (Three) Times a Day., Disp: , Rfl:   •  imipramine (TOFRANIL) 10 MG tablet, Take 10 mg by mouth 2 (Two) Times a Day., Disp: , Rfl:   •  levothyroxine (SYNTHROID, LEVOTHROID) 50 MCG tablet, Take 50 mcg by mouth Daily., Disp: , Rfl:       History of Present Illness   Vidhya Park is a 38 y.o. year old female here for follow up.    Pt in fu and frustrated after getting her drivers  "license revoked due to abnormal EKG.  Pt has seen neurologists at  and [facility] and is now to see specialists at [place].  Pt notes daily random drops in BP and occasional tachycardia.  Her  states he notes this happens postprandially but she does not agree.  She describes symptoms presyncope without bharat syncope and she is learned to pace herself to avoid issue.    ROS:  All systems have been reviewed and are negative with the exception of those mentioned in the HPI.    OBJECTIVE:  Vitals:    04/15/19 0854   BP: 122/82   BP Location: Right arm   Patient Position: Sitting   Pulse: 82   SpO2: 99%   Weight: 96.8 kg (213 lb 6.4 oz)   Height: 175.3 cm (69\")     Physical Exam   Constitutional: She appears well-developed and well-nourished.   Neck: Normal range of motion. Neck supple. No hepatojugular reflux and no JVD present. Carotid bruit is not present. No tracheal deviation present. No thyromegaly present.   Cardiovascular: Normal rate, regular rhythm, S1 normal, S2 normal, intact distal pulses and normal pulses. PMI is not displaced. Exam reveals no gallop, no distant heart sounds, no friction rub, no midsystolic click and no opening snap.   No murmur heard.  Pulses:       Radial pulses are 2+ on the right side, and 2+ on the left side.        Dorsalis pedis pulses are 2+ on the right side, and 2+ on the left side.        Posterior tibial pulses are 2+ on the right side, and 2+ on the left side.   Pulmonary/Chest: Effort normal and breath sounds normal. She has no wheezes. She has no rales.   Abdominal: Soft. Bowel sounds are normal. She exhibits no mass. There is no tenderness. There is no guarding.       Diagnostic Data:    ECG 12 Lead  Date/Time: 4/15/2019 9:51 AM  Performed by: Bjorn Thomas MD  Authorized by: Bjorn Thomas MD   Rhythm: sinus rhythm    Clinical impression: non-specific ECG              ASSESSMENT:   Diagnosis Plan   1. Dizziness     2. Palpitations     3. Mixed hyperlipidemia   "       PLAN:  Ms. Park has a symptoms of random palpitations presyncope and dizziness.  She is on several neuroleptic medications and has had significant changes in such over the last 1 year.  At one point she was on glucocorticoids with a Medrol Dosepak and her neurologist had discussed with her that she may have symptoms of multiple sclerosis without demyelinating plaques noted on MRI.  She has become frustrated with seeing multiple neurologic specialists and is anxious to have consolidation of symptoms and hopeful that she will attain this with an evaluation in [place].  Unfortunately M data deficient regarding her neurological evaluations as they are out of our hospital system.  Nonetheless she has no significant cardiac findings at this time with structurally normal heart acceptable tilt table test and Holter monitoring that did not reveal any significant pathologic arrhythmia.    Dyslipidemia on atorvastatin levels followed per her PCP    I will see her back on an as-needed basis.    Jefferson Patino PA-C, thank you for referring Ms. Park for evaluation.  I have forwarded my electronically generated recommendations to you for review.  Please do not hesitate to call with any questions.      Bjorn Thomas MD, FACC

## 2019-04-15 ENCOUNTER — OFFICE VISIT (OUTPATIENT)
Dept: CARDIOLOGY | Facility: CLINIC | Age: 39
End: 2019-04-15

## 2019-04-15 VITALS
HEART RATE: 82 BPM | DIASTOLIC BLOOD PRESSURE: 82 MMHG | SYSTOLIC BLOOD PRESSURE: 122 MMHG | WEIGHT: 213.4 LBS | HEIGHT: 69 IN | BODY MASS INDEX: 31.61 KG/M2 | OXYGEN SATURATION: 99 %

## 2019-04-15 DIAGNOSIS — R42 DIZZINESS: Primary | ICD-10-CM

## 2019-04-15 DIAGNOSIS — R00.2 PALPITATIONS: ICD-10-CM

## 2019-04-15 DIAGNOSIS — E78.2 MIXED HYPERLIPIDEMIA: ICD-10-CM

## 2019-04-15 PROCEDURE — 93000 ELECTROCARDIOGRAM COMPLETE: CPT | Performed by: INTERNAL MEDICINE

## 2019-04-15 PROCEDURE — 99213 OFFICE O/P EST LOW 20 MIN: CPT | Performed by: INTERNAL MEDICINE

## 2019-04-15 RX ORDER — DULOXETIN HYDROCHLORIDE 30 MG/1
30 CAPSULE, DELAYED RELEASE ORAL 2 TIMES DAILY
COMMUNITY

## 2019-04-15 RX ORDER — BACLOFEN 10 MG/1
10 TABLET ORAL 2 TIMES DAILY
COMMUNITY

## 2019-04-15 RX ORDER — FLUOXETINE HYDROCHLORIDE 20 MG/1
20 CAPSULE ORAL EVERY MORNING
Refills: 1 | COMMUNITY
Start: 2019-03-27

## 2020-03-03 ENCOUNTER — TRANSCRIBE ORDERS (OUTPATIENT)
Dept: ADMINISTRATIVE | Facility: HOSPITAL | Age: 40
End: 2020-03-03

## 2020-03-03 DIAGNOSIS — I49.8 ATRIAL ARRHYTHMIA: Primary | ICD-10-CM

## 2020-03-05 ENCOUNTER — HOSPITAL ENCOUNTER (OUTPATIENT)
Dept: RESPIRATORY THERAPY | Facility: HOSPITAL | Age: 40
Discharge: HOME | End: 2020-03-05

## 2020-03-05 ENCOUNTER — HOSPITAL ENCOUNTER (OUTPATIENT)
Dept: CARDIOLOGY | Facility: HOSPITAL | Age: 40
Discharge: HOME | End: 2020-03-05
Admitting: PHYSICIAN ASSISTANT

## 2020-03-05 ENCOUNTER — TRANSCRIBE ORDERS (OUTPATIENT)
Dept: OTHER | Facility: OTHER | Age: 40
End: 2020-03-05

## 2020-03-05 DIAGNOSIS — I49.8 NODAL RHYTHM DISORDER: Primary | ICD-10-CM

## 2020-03-05 DIAGNOSIS — I49.8 ATRIAL ARRHYTHMIA: ICD-10-CM

## 2020-03-05 DIAGNOSIS — I49.8 NODAL RHYTHM DISORDER: ICD-10-CM

## 2020-03-05 LAB
BH CV ECHO MEAS - % IVS THICK: 40.1 %
BH CV ECHO MEAS - % LVPW THICK: 71.4 %
BH CV ECHO MEAS - ACS: 2.3 CM
BH CV ECHO MEAS - AO ROOT AREA (BSA CORRECTED): 1.4
BH CV ECHO MEAS - AO ROOT AREA: 7.3 CM^2
BH CV ECHO MEAS - AO ROOT DIAM: 3.1 CM
BH CV ECHO MEAS - BSA(HAYCOCK): 2.2 M^2
BH CV ECHO MEAS - BSA: 2.1 M^2
BH CV ECHO MEAS - BZI_BMI: 31.5 KILOGRAMS/M^2
BH CV ECHO MEAS - BZI_METRIC_HEIGHT: 175.3 CM
BH CV ECHO MEAS - BZI_METRIC_WEIGHT: 96.6 KG
BH CV ECHO MEAS - EDV(CUBED): 67.4 ML
BH CV ECHO MEAS - EDV(MOD-SP4): 40.7 ML
BH CV ECHO MEAS - EDV(TEICH): 72.9 ML
BH CV ECHO MEAS - EF(CUBED): 63.4 %
BH CV ECHO MEAS - EF(MOD-SP4): 65.4 %
BH CV ECHO MEAS - EF(TEICH): 55.5 %
BH CV ECHO MEAS - ESV(CUBED): 24.6 ML
BH CV ECHO MEAS - ESV(MOD-SP4): 14.1 ML
BH CV ECHO MEAS - ESV(TEICH): 32.5 ML
BH CV ECHO MEAS - FS: 28.5 %
BH CV ECHO MEAS - IVS/LVPW: 0.95
BH CV ECHO MEAS - IVSD: 1 CM
BH CV ECHO MEAS - IVSS: 1.4 CM
BH CV ECHO MEAS - LA DIMENSION: 3.5 CM
BH CV ECHO MEAS - LA/AO: 1.1
BH CV ECHO MEAS - LV DIASTOLIC VOL/BSA (35-75): 19.2 ML/M^2
BH CV ECHO MEAS - LV MASS(C)D: 135.2 GRAMS
BH CV ECHO MEAS - LV MASS(C)DI: 63.7 GRAMS/M^2
BH CV ECHO MEAS - LV MASS(C)S: 169.9 GRAMS
BH CV ECHO MEAS - LV MASS(C)SI: 80.1 GRAMS/M^2
BH CV ECHO MEAS - LV SYSTOLIC VOL/BSA (12-30): 6.6 ML/M^2
BH CV ECHO MEAS - LVIDD: 4.1 CM
BH CV ECHO MEAS - LVIDS: 2.9 CM
BH CV ECHO MEAS - LVLD AP4: 7.6 CM
BH CV ECHO MEAS - LVLS AP4: 6.7 CM
BH CV ECHO MEAS - LVOT AREA (M): 3.1 CM^2
BH CV ECHO MEAS - LVOT AREA: 3.1 CM^2
BH CV ECHO MEAS - LVOT DIAM: 2 CM
BH CV ECHO MEAS - LVPWD: 1.1 CM
BH CV ECHO MEAS - LVPWS: 1.8 CM
BH CV ECHO MEAS - MV A MAX VEL: 66 CM/SEC
BH CV ECHO MEAS - MV E MAX VEL: 75.4 CM/SEC
BH CV ECHO MEAS - MV E/A: 1.1
BH CV ECHO MEAS - PA ACC TIME: 0.12 SEC
BH CV ECHO MEAS - PA PR(ACCEL): 23.7 MMHG
BH CV ECHO MEAS - RVDD: 2 CM
BH CV ECHO MEAS - SI(CUBED): 20.2 ML/M^2
BH CV ECHO MEAS - SI(MOD-SP4): 12.5 ML/M^2
BH CV ECHO MEAS - SI(TEICH): 19.1 ML/M^2
BH CV ECHO MEAS - SV(CUBED): 42.8 ML
BH CV ECHO MEAS - SV(MOD-SP4): 26.6 ML
BH CV ECHO MEAS - SV(TEICH): 40.5 ML
MAXIMAL PREDICTED HEART RATE: 180 BPM
STRESS TARGET HR: 153 BPM

## 2020-03-05 PROCEDURE — 93306 TTE W/DOPPLER COMPLETE: CPT | Performed by: INTERNAL MEDICINE

## 2020-03-05 PROCEDURE — 93226 XTRNL ECG REC<48 HR SCAN A/R: CPT

## 2020-03-05 PROCEDURE — 93225 XTRNL ECG REC<48 HRS REC: CPT

## 2020-03-05 PROCEDURE — 93306 TTE W/DOPPLER COMPLETE: CPT

## 2020-03-09 ENCOUNTER — OFFICE VISIT (OUTPATIENT)
Dept: CARDIOLOGY | Facility: CLINIC | Age: 40
End: 2020-03-09

## 2020-03-09 VITALS
BODY MASS INDEX: 35.74 KG/M2 | DIASTOLIC BLOOD PRESSURE: 76 MMHG | RESPIRATION RATE: 16 BRPM | HEART RATE: 89 BPM | HEIGHT: 68 IN | SYSTOLIC BLOOD PRESSURE: 116 MMHG | WEIGHT: 235.8 LBS

## 2020-03-09 DIAGNOSIS — R07.2 PRECORDIAL PAIN: Primary | ICD-10-CM

## 2020-03-09 DIAGNOSIS — R06.02 SHORTNESS OF BREATH: ICD-10-CM

## 2020-03-09 DIAGNOSIS — R00.2 PALPITATIONS: ICD-10-CM

## 2020-03-09 DIAGNOSIS — E78.2 MIXED HYPERLIPIDEMIA: ICD-10-CM

## 2020-03-09 PROCEDURE — 93227 XTRNL ECG REC<48 HR R&I: CPT | Performed by: INTERNAL MEDICINE

## 2020-03-09 PROCEDURE — 93000 ELECTROCARDIOGRAM COMPLETE: CPT | Performed by: SPECIALIST

## 2020-03-09 PROCEDURE — 99204 OFFICE O/P NEW MOD 45 MIN: CPT | Performed by: SPECIALIST

## 2020-03-09 RX ORDER — ERGOCALCIFEROL 1.25 MG/1
50000 CAPSULE ORAL
COMMUNITY
Start: 2020-02-26

## 2020-03-09 RX ORDER — BUPROPION HYDROCHLORIDE 150 MG/1
TABLET, EXTENDED RELEASE ORAL
COMMUNITY
Start: 2019-12-20

## 2020-03-09 RX ORDER — CARIPRAZINE 3 MG/1
CAPSULE, GELATIN COATED ORAL
COMMUNITY
Start: 2020-01-22

## 2020-03-09 NOTE — PROGRESS NOTES
Subjective   Initial consultation, shortness of breath and chest pain  Vidhya Park is a 39 y.o. 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    CHIEF COMPLIANT  Chief Complaint   Patient presents with   • Chest Pain     longstanding, worsened last 2 mos, echo recent   • Pots syndrome     diagnosed last year, [place], [place] facility   • Shortness of Breath     routine activity   • Palpitations     races,skips,flutters   • Med Management     verbal       Active Problems:  Problem List Items Addressed This Visit        Cardiovascular and Mediastinum    Palpitations    Mixed hyperlipidemia       Respiratory    Shortness of breath       Nervous and Auditory    Precordial pain - Primary          HPI  HPI  She has been having shortness of breath and chest pain for about 2 1/2 years but this recently has been progressive she said if she walks for few steps she will get short of breath the chest pain shortness of breath separate she notes that her shortness of breath is worse with exertion and also when she lays down so she is sleeping on the recliner and this has been going on for about 2 months she did not notice any swelling she also has palpitations especially diastolics she was seen by Dr. Atkinson and he told her that she has POTS the past year she had episodes of syncope but no recent episode regarding her chest pain it is retrosternal radiating to her back on and off the duration of the pain varies shanda couple of hours it happens at least once or twice a day it is retrosternal pressure-like the pain is not specifically caused by any specific activity is on and off her maternal grandparents both had heart trouble in the past no hypertension she does have hyperlipidemia she had bilateral oophorectomy  and hysterectomy for almost 18 years she does not smoke she is fatigued all the time she denied snoring or sleepiness  PRIOR MEDS  Current Outpatient Medications on File Prior to Visit   Medication Sig Dispense Refill   • atorvastatin (LIPITOR) 20 MG tablet Take 20 mg by mouth Daily.     • gabapentin (NEURONTIN) 600 MG tablet Take 800 mg by mouth 3 (Three) Times a Day.     • imipramine (TOFRANIL) 10 MG tablet Take 10 mg by mouth 2 (Two) Times a Day.     • levothyroxine (SYNTHROID, LEVOTHROID) 50 MCG tablet Take 50 mcg by mouth Daily.     • vitamin D (ERGOCALCIFEROL) 1.25 MG (14061 UT) capsule capsule 50,000 Units Every 7 (Seven) Days.     • baclofen (LIORESAL) 10 MG tablet Take 10 mg by mouth 2 (Two) Times a Day.     • buPROPion SR (WELLBUTRIN SR) 150 MG 12 hr tablet      • DULoxetine (CYMBALTA) 30 MG capsule Take 30 mg by mouth 2 (Two) Times a Day.     • ESOMEPRAZOLE MAGNESIUM PO Take 40 mg by mouth Daily.     • FLUoxetine (PROzac) 20 MG capsule Take 20 mg by mouth Every Morning.  1   • VRAYLAR 3 MG capsule TK 1 C PO QD       No current facility-administered medications on file prior to visit.        ALLERGIES  Patient has no known allergies.    HISTORY  Past Medical History:   Diagnosis Date   • Hyperlipidemia    • Pott's disease    • Thyroid disorder        Social History     Socioeconomic History   • Marital status:      Spouse name: Not on file   • Number of children: Not on file   • Years of education: Not on file   • Highest education level: Not on file   Tobacco Use   • Smoking status: Never Smoker   • Smokeless tobacco: Never Used   Substance and Sexual Activity   • Alcohol use: No   • Drug use: No   • Sexual activity: Defer     Partners: Male   Social History Narrative    Caffeine: 4-5 servings per day    Patient live a t home wit her  and kids       Family History   Problem Relation Age of Onset   • Breast cancer Maternal Grandmother    • Breast cancer Maternal Aunt    • Breast cancer  "Maternal Aunt    • Heart disease Maternal Aunt    • Cancer Mother    • Thyroid disease Mother    • Heart disease Father    • No Known Problems Sister    • Hypertension Brother    • Hyperlipidemia Brother    • Stroke Maternal Grandfather    • Hyperlipidemia Maternal Grandfather    • Diabetes Maternal Grandfather    • Heart disease Maternal Grandfather        Review of Systems   Constitutional: Positive for activity change. Negative for appetite change.   HENT: Negative for congestion and drooling.    Eyes: Positive for visual disturbance.   Respiratory: Positive for shortness of breath.    Cardiovascular: Positive for chest pain and palpitations.   Gastrointestinal: Negative for abdominal distention and abdominal pain.   Endocrine: Negative for cold intolerance and heat intolerance.   Genitourinary: Negative for difficulty urinating and flank pain.   Musculoskeletal: Positive for back pain and myalgias.   Skin: Negative for color change and pallor.   Allergic/Immunologic: Negative for immunocompromised state.   Neurological: Positive for dizziness, light-headedness, numbness and headaches.   Hematological: Does not bruise/bleed easily.   Psychiatric/Behavioral: Negative for agitation and behavioral problems.       Objective     VITALS: /76 (BP Location: Left arm)   Pulse 89   Resp 16   Ht 172.7 cm (68\")   Wt 107 kg (235 lb 12.8 oz)   BMI 35.85 kg/m²     LABS:   Lab Results (most recent)     None          IMAGING:   No Images in the past 120 days found..    EXAM:  Physical Exam   Constitutional: She is oriented to person, place, and time. She appears well-developed and well-nourished.   HENT:   Head: Normocephalic and atraumatic.   Eyes: Pupils are equal, round, and reactive to light.   Neck: Neck supple. No JVD present. No thyromegaly present.   Cardiovascular: Normal rate, regular rhythm, normal heart sounds and intact distal pulses. Exam reveals no gallop and no friction rub.   No murmur " heard.  Pulmonary/Chest: Effort normal and breath sounds normal. No stridor. No respiratory distress. She has no wheezes. She has no rales. She exhibits no tenderness.   Musculoskeletal: She exhibits no edema, tenderness or deformity.   Neurological: She is alert and oriented to person, place, and time. No cranial nerve deficit. Coordination normal.   Skin: Skin is warm and dry.   Psychiatric: She has a normal mood and affect.   Vitals reviewed.      Procedure     ECG 12 Lead  Date/Time: 3/9/2020 2:13 PM  Performed by: Negar Adames MD  Authorized by: Negar Adames MD             EKG: Normal sinus rhythm with diffuse nonspecific T wave inversion otherwise unremarkable EKG comparing with the EKG on 4/15/2019 the T wave inversion is new       Assessment/Plan    Diagnosis Plan   1. Precordial pain     2. Shortness of breath     3. Palpitations     4. Mixed hyperlipidemia       1.  Chest pain was typical and atypical features we will proceed with stress testing for assessment of ischemia will also get a d-dimer level  2.  I reviewed her echocardiogram with normal left ventricular systolic function and essentially unremarkable if the stress test is negative for ischemia considering lung function study for assessment of the degree of shortness of breath  3.  I reviewed the Holter monitor with no severe arrhythmias the patient has been diagnosed with POTS  4.  Regarding her hyperlipidemia continue the statin  No follow-ups on file.    Vidhya was seen today for chest pain, pots syndrome, shortness of breath, palpitations and med management.    Diagnoses and all orders for this visit:    Precordial pain    Shortness of breath    Palpitations    Mixed hyperlipidemia    Other orders  -     ECG 12 Lead                   MEDS ORDERED DURING VISIT:  No orders of the defined types were placed in this encounter.        This document has been electronically signed by Negar Adames MD  March 9, 2020 2:39 PM

## 2020-03-31 ENCOUNTER — APPOINTMENT (OUTPATIENT)
Dept: CARDIOLOGY | Facility: HOSPITAL | Age: 40
End: 2020-03-31

## 2020-03-31 ENCOUNTER — APPOINTMENT (OUTPATIENT)
Dept: NUCLEAR MEDICINE | Facility: HOSPITAL | Age: 40
End: 2020-03-31

## 2021-03-18 ENCOUNTER — BULK ORDERING (OUTPATIENT)
Dept: CASE MANAGEMENT | Facility: OTHER | Age: 41
End: 2021-03-18

## 2021-03-18 DIAGNOSIS — Z23 IMMUNIZATION DUE: ICD-10-CM

## 2021-11-20 ENCOUNTER — HOSPITAL ENCOUNTER (EMERGENCY)
Facility: HOSPITAL | Age: 41
Discharge: HOME | End: 2021-11-20
Attending: EMERGENCY MEDICINE | Admitting: EMERGENCY MEDICINE

## 2021-11-20 ENCOUNTER — APPOINTMENT (OUTPATIENT)
Dept: MRI IMAGING | Facility: HOSPITAL | Age: 41
End: 2021-11-20

## 2021-11-20 VITALS
DIASTOLIC BLOOD PRESSURE: 85 MMHG | SYSTOLIC BLOOD PRESSURE: 122 MMHG | OXYGEN SATURATION: 99 % | HEART RATE: 72 BPM | BODY MASS INDEX: 32.28 KG/M2 | TEMPERATURE: 97.9 F | RESPIRATION RATE: 18 BRPM | HEIGHT: 68 IN | WEIGHT: 213 LBS

## 2021-11-20 DIAGNOSIS — M54.50 LOW BACK PAIN WITHOUT SCIATICA, UNSPECIFIED BACK PAIN LATERALITY, UNSPECIFIED CHRONICITY: Primary | ICD-10-CM

## 2021-11-20 LAB
ALBUMIN SERPL-MCNC: 4.34 G/DL (ref 3.5–5.2)
ALBUMIN/GLOB SERPL: 1.4 G/DL
ALP SERPL-CCNC: 77 U/L (ref 39–117)
ALT SERPL W P-5'-P-CCNC: 11 U/L (ref 1–33)
AMPHET+METHAMPHET UR QL: NEGATIVE
AMPHETAMINES UR QL: NEGATIVE
ANION GAP SERPL CALCULATED.3IONS-SCNC: 10.9 MMOL/L (ref 5–15)
AST SERPL-CCNC: 15 U/L (ref 1–32)
BARBITURATES UR QL SCN: NEGATIVE
BASOPHILS # BLD AUTO: 0.03 10*3/MM3 (ref 0–0.2)
BASOPHILS NFR BLD AUTO: 0.2 % (ref 0–1.5)
BENZODIAZ UR QL SCN: NEGATIVE
BILIRUB SERPL-MCNC: 0.4 MG/DL (ref 0–1.2)
BILIRUB UR QL STRIP: ABNORMAL
BUN SERPL-MCNC: 18 MG/DL (ref 6–20)
BUN/CREAT SERPL: 18.2 (ref 7–25)
BUPRENORPHINE SERPL-MCNC: NEGATIVE NG/ML
CALCIUM SPEC-SCNC: 9.6 MG/DL (ref 8.6–10.5)
CANNABINOIDS SERPL QL: NEGATIVE
CHLORIDE SERPL-SCNC: 111 MMOL/L (ref 98–107)
CLARITY UR: ABNORMAL
CO2 SERPL-SCNC: 21.1 MMOL/L (ref 22–29)
COCAINE UR QL: NEGATIVE
COLOR UR: ABNORMAL
CREAT SERPL-MCNC: 0.99 MG/DL (ref 0.57–1)
DEPRECATED RDW RBC AUTO: 44.1 FL (ref 37–54)
EOSINOPHIL # BLD AUTO: 0.01 10*3/MM3 (ref 0–0.4)
EOSINOPHIL NFR BLD AUTO: 0.1 % (ref 0.3–6.2)
ERYTHROCYTE [DISTWIDTH] IN BLOOD BY AUTOMATED COUNT: 13.6 % (ref 12.3–15.4)
GFR SERPL CREATININE-BSD FRML MDRD: 62 ML/MIN/1.73
GLOBULIN UR ELPH-MCNC: 3.1 GM/DL
GLUCOSE SERPL-MCNC: 89 MG/DL (ref 65–99)
GLUCOSE UR STRIP-MCNC: NEGATIVE MG/DL
HCT VFR BLD AUTO: 41.7 % (ref 34–46.6)
HGB BLD-MCNC: 13.2 G/DL (ref 12–15.9)
HGB UR QL STRIP.AUTO: NEGATIVE
HOLD SPECIMEN: NORMAL
HOLD SPECIMEN: NORMAL
IMM GRANULOCYTES # BLD AUTO: 0.06 10*3/MM3 (ref 0–0.05)
IMM GRANULOCYTES NFR BLD AUTO: 0.5 % (ref 0–0.5)
KETONES UR QL STRIP: ABNORMAL
LEUKOCYTE ESTERASE UR QL STRIP.AUTO: NEGATIVE
LYMPHOCYTES # BLD AUTO: 3.68 10*3/MM3 (ref 0.7–3.1)
LYMPHOCYTES NFR BLD AUTO: 27.9 % (ref 19.6–45.3)
MCH RBC QN AUTO: 28.1 PG (ref 26.6–33)
MCHC RBC AUTO-ENTMCNC: 31.7 G/DL (ref 31.5–35.7)
MCV RBC AUTO: 88.9 FL (ref 79–97)
METHADONE UR QL SCN: NEGATIVE
MONOCYTES # BLD AUTO: 0.73 10*3/MM3 (ref 0.1–0.9)
MONOCYTES NFR BLD AUTO: 5.5 % (ref 5–12)
NEUTROPHILS NFR BLD AUTO: 65.8 % (ref 42.7–76)
NEUTROPHILS NFR BLD AUTO: 8.66 10*3/MM3 (ref 1.7–7)
NITRITE UR QL STRIP: NEGATIVE
NRBC BLD AUTO-RTO: 0 /100 WBC (ref 0–0.2)
OPIATES UR QL: POSITIVE
OXYCODONE UR QL SCN: NEGATIVE
PCP UR QL SCN: NEGATIVE
PH UR STRIP.AUTO: <=5 [PH] (ref 5–8)
PLATELET # BLD AUTO: 259 10*3/MM3 (ref 140–450)
PMV BLD AUTO: 11.9 FL (ref 6–12)
POTASSIUM SERPL-SCNC: 3.9 MMOL/L (ref 3.5–5.2)
PROPOXYPH UR QL: NEGATIVE
PROT SERPL-MCNC: 7.4 G/DL (ref 6–8.5)
PROT UR QL STRIP: ABNORMAL
RBC # BLD AUTO: 4.69 10*6/MM3 (ref 3.77–5.28)
SODIUM SERPL-SCNC: 143 MMOL/L (ref 136–145)
SP GR UR STRIP: >1.03 (ref 1–1.03)
TRICYCLICS UR QL SCN: NEGATIVE
UROBILINOGEN UR QL STRIP: ABNORMAL
WBC NRBC COR # BLD: 13.17 10*3/MM3 (ref 3.4–10.8)
WHOLE BLOOD HOLD SPECIMEN: NORMAL
WHOLE BLOOD HOLD SPECIMEN: NORMAL

## 2021-11-20 PROCEDURE — 72148 MRI LUMBAR SPINE W/O DYE: CPT | Performed by: RADIOLOGY

## 2021-11-20 PROCEDURE — 81003 URINALYSIS AUTO W/O SCOPE: CPT | Performed by: PHYSICIAN ASSISTANT

## 2021-11-20 PROCEDURE — 80053 COMPREHEN METABOLIC PANEL: CPT | Performed by: PHYSICIAN ASSISTANT

## 2021-11-20 PROCEDURE — 72141 MRI NECK SPINE W/O DYE: CPT

## 2021-11-20 PROCEDURE — 36415 COLL VENOUS BLD VENIPUNCTURE: CPT

## 2021-11-20 PROCEDURE — 72141 MRI NECK SPINE W/O DYE: CPT | Performed by: RADIOLOGY

## 2021-11-20 PROCEDURE — 85025 COMPLETE CBC W/AUTO DIFF WBC: CPT | Performed by: PHYSICIAN ASSISTANT

## 2021-11-20 PROCEDURE — 25010000002 ORPHENADRINE CITRATE PER 60 MG: Performed by: PHYSICIAN ASSISTANT

## 2021-11-20 PROCEDURE — 99283 EMERGENCY DEPT VISIT LOW MDM: CPT

## 2021-11-20 PROCEDURE — 96375 TX/PRO/DX INJ NEW DRUG ADDON: CPT

## 2021-11-20 PROCEDURE — 72148 MRI LUMBAR SPINE W/O DYE: CPT

## 2021-11-20 PROCEDURE — 72146 MRI CHEST SPINE W/O DYE: CPT

## 2021-11-20 PROCEDURE — 72146 MRI CHEST SPINE W/O DYE: CPT | Performed by: RADIOLOGY

## 2021-11-20 PROCEDURE — 25010000002 HYDROMORPHONE 1 MG/ML SOLUTION: Performed by: EMERGENCY MEDICINE

## 2021-11-20 PROCEDURE — 96374 THER/PROPH/DIAG INJ IV PUSH: CPT

## 2021-11-20 PROCEDURE — 80306 DRUG TEST PRSMV INSTRMNT: CPT | Performed by: PHYSICIAN ASSISTANT

## 2021-11-20 RX ORDER — ORPHENADRINE CITRATE 30 MG/ML
60 INJECTION INTRAMUSCULAR; INTRAVENOUS ONCE
Status: COMPLETED | OUTPATIENT
Start: 2021-11-20 | End: 2021-11-20

## 2021-11-20 RX ORDER — SODIUM CHLORIDE 0.9 % (FLUSH) 0.9 %
10 SYRINGE (ML) INJECTION AS NEEDED
Status: DISCONTINUED | OUTPATIENT
Start: 2021-11-20 | End: 2021-11-20 | Stop reason: HOSPADM

## 2021-11-20 RX ADMIN — HYDROMORPHONE HYDROCHLORIDE 1 MG: 1 INJECTION, SOLUTION INTRAMUSCULAR; INTRAVENOUS; SUBCUTANEOUS at 12:34

## 2021-11-20 RX ADMIN — ORPHENADRINE CITRATE 60 MG: 30 INJECTION INTRAMUSCULAR; INTRAVENOUS at 12:33

## 2021-11-20 NOTE — ED PROVIDER NOTES
Subjective   42 yo female patient presents to the ED with complaints of back pain that has worsened over the last 4 days. Pt reports hx of chronic back pain. She was seen by PCP and given Toradol shot and steroids to take at home.  Pt states she is not getting better. Pt denies any injury. Reports that she has been defacating on herself and not urinating as much. Denies any numbness or tingling.  Her PCP recommended she come to the ED for imaging.       History provided by:  Patient   used: No        Review of Systems   Constitutional: Negative.    HENT: Negative.    Eyes: Negative.    Respiratory: Negative.    Cardiovascular: Negative.    Gastrointestinal: Negative.    Endocrine: Negative.    Genitourinary: Negative.    Musculoskeletal: Positive for back pain.   Skin: Negative.    Allergic/Immunologic: Negative.    Neurological: Negative.    Hematological: Negative.    Psychiatric/Behavioral: Negative.    All other systems reviewed and are negative.      Past Medical History:   Diagnosis Date   • Hyperlipidemia    • Pott's disease    • Thyroid disorder        No Known Allergies    Past Surgical History:   Procedure Laterality Date   • CHOLECYSTECTOMY     • CYST REMOVAL      back,   • HYSTERECTOMY         Family History   Problem Relation Age of Onset   • Breast cancer Maternal Grandmother    • Breast cancer Maternal Aunt    • Breast cancer Maternal Aunt    • Heart disease Maternal Aunt    • Cancer Mother    • Thyroid disease Mother    • Heart disease Father    • No Known Problems Sister    • Hypertension Brother    • Hyperlipidemia Brother    • Stroke Maternal Grandfather    • Hyperlipidemia Maternal Grandfather    • Diabetes Maternal Grandfather    • Heart disease Maternal Grandfather        Social History     Socioeconomic History   • Marital status:    Tobacco Use   • Smoking status: Never Smoker   • Smokeless tobacco: Never Used   Substance and Sexual Activity   • Alcohol use: No   •  Drug use: No   • Sexual activity: Defer     Partners: Male           Objective   Physical Exam  Vitals and nursing note reviewed.   Constitutional:       Appearance: Normal appearance. She is normal weight.   HENT:      Head: Normocephalic and atraumatic.      Right Ear: External ear normal.      Left Ear: External ear normal.      Nose: Nose normal.      Mouth/Throat:      Mouth: Mucous membranes are moist.      Pharynx: Oropharynx is clear.   Eyes:      Extraocular Movements: Extraocular movements intact.      Conjunctiva/sclera: Conjunctivae normal.      Pupils: Pupils are equal, round, and reactive to light.   Cardiovascular:      Rate and Rhythm: Normal rate and regular rhythm.      Pulses: Normal pulses.      Heart sounds: Normal heart sounds.   Pulmonary:      Effort: Pulmonary effort is normal.      Breath sounds: Normal breath sounds.   Abdominal:      General: Abdomen is flat. Bowel sounds are normal.      Palpations: Abdomen is soft.   Genitourinary:     Rectum: Normal. Normal anal tone.      Comments: Bhavana Stoddard RN chaperone   Musculoskeletal:      Cervical back: Normal range of motion and neck supple.      Lumbar back: Tenderness present. Positive right straight leg raise test and positive left straight leg raise test.        Back:       Comments: Normal reflexes. Normal pulses. No swelling or deformity.     Skin:     General: Skin is warm and dry.      Capillary Refill: Capillary refill takes less than 2 seconds.   Neurological:      General: No focal deficit present.      Mental Status: She is alert and oriented to person, place, and time. Mental status is at baseline.      Cranial Nerves: Cranial nerves are intact.      Sensory: Sensation is intact.      Motor: Motor function is intact.      Coordination: Coordination is intact.      Gait: Gait is intact.      Deep Tendon Reflexes: Reflexes are normal and symmetric.      Reflex Scores:       Tricep reflexes are 2+ on the right side and 2+ on the  left side.       Bicep reflexes are 2+ on the right side and 2+ on the left side.       Brachioradialis reflexes are 2+ on the right side and 2+ on the left side.       Patellar reflexes are 2+ on the right side and 2+ on the left side.       Achilles reflexes are 2+ on the right side and 2+ on the left side.  Psychiatric:         Mood and Affect: Mood normal.         Behavior: Behavior normal.         Thought Content: Thought content normal.         Judgment: Judgment normal.         Procedures           ED Course  ED Course as of 11/20/21 1850   Sat Nov 20, 2021   1300 Discussed with Dr. Crump will proceed with MRI as patient is reporting difficulty urinating and defecating on herself without realizing it in the setting of her acute onset of back pain.  [ML]   1350 MRI Cervical Spine Without Contrast  Pt requested this while she was in MRI.  Stating she was also having neck pain and wanted that evaluated while she was here.  [ML]   1415 MRI Cervical Spine Without Contrast     IMPRESSION:     1. No acute fracture or traumatic malalignment.  2. No soft tissue abnormality or disc space abnormality. No epidural  fluid collections.  3. Degenerative disc disease C6/7 without significant stenosis.     This report was finalized on 11/20/2021 2:11 PM by Dr. Kole Hopson MD.             Specimen Collected: 11/20/21 14:10 Last Resulted: 11/20/21 14:11           [ML]   1416 MRI Lumbar Spine Without Contrast     IMPRESSION:     1. No significant degenerative disc disease. No levels of stenosis. No  acute appearing disc herniations.  2. No fracture or traumatic malalignment.  3. Suspicious T2 hyperintense lesion of the right sacrum at the S1 level  that does not appear to represent characteristic hemangioma. Consider  follow-up with bone scan.     This report was finalized on 11/20/2021 1:55 PM by Dr. Kole Hopson MD.             Specimen Collected: 11/20/21 13:52 Last Resulted: 11/20/21 13:55         [ML]   1416 MRI  Thoracic Spine Without Contrast    IMPRESSION:     1. Mild degenerative disc disease with no levels of stenosis identified.  Small superimposed disc protrusion at the T4/5 level as detailed above.  2. No fracture or traumatic malalignment.  3. Multiple benign-appearing hemangiomas.  4. Borderline cardiac enlargement with fluid in the superior aortic  recess.     This report was finalized on 11/20/2021 1:52 PM by Dr. Kole Hopson MD.             Specimen Collected: 11/20/21 13:50 Last Resulted: 11/20/21 13:52           [ML]   1540 Discussed with Dr. Reynaldo Real- he does not see any reason for the pt to be seen today or immediately. He recommends patient call his office on Monday and he will see her sometime before wednesday. Patient was sent home with disc. He recommended pain meds.   [ML]   1545 Discussed sx that would warrant return to the ED. Discussed thoroughly with patient recommendations. Pt states she has muscle relaxers, hydrocodone, and steroids at home.  [ML]      ED Course User Index  [ML] Amanda Hernández PA                                           MDM  Number of Diagnoses or Management Options     Amount and/or Complexity of Data Reviewed  Clinical lab tests: ordered and reviewed  Tests in the radiology section of CPT®: ordered and reviewed  Tests in the medicine section of CPT®: reviewed  Discuss the patient with other providers: yes    Risk of Complications, Morbidity, and/or Mortality  Presenting problems: low  Diagnostic procedures: low  Management options: low    Patient Progress  Patient progress: improved      Final diagnoses:   Low back pain without sciatica, unspecified back pain laterality, unspecified chronicity       ED Disposition  ED Disposition     ED Disposition Condition Comment    Discharge Stable           Jefferson Patino PA-C  [address] [place] [id number] [phone]    Schedule an appointment as soon as possible for a visit in 1 day      Celestina  Fred MILLS MD  [address] [facility] [address]  [phone]    Schedule an appointment as soon as possible for a visit in 1 day  Need to be seen before Wednedsday         Medication List      No changes were made to your prescriptions during this visit.          Amanda Hernández PA  11/20/21 3658

## 2021-12-01 ENCOUNTER — HOSPITAL ENCOUNTER (EMERGENCY)
Facility: HOSPITAL | Age: 41
Discharge: HOME | End: 2021-12-02
Attending: EMERGENCY MEDICINE | Admitting: EMERGENCY MEDICINE

## 2021-12-01 ENCOUNTER — APPOINTMENT (OUTPATIENT)
Dept: CT IMAGING | Facility: HOSPITAL | Age: 41
End: 2021-12-01

## 2021-12-01 ENCOUNTER — APPOINTMENT (OUTPATIENT)
Dept: GENERAL RADIOLOGY | Facility: HOSPITAL | Age: 41
End: 2021-12-01

## 2021-12-01 DIAGNOSIS — U07.1 COVID-19: Primary | ICD-10-CM

## 2021-12-01 DIAGNOSIS — U07.1 PNEUMONIA DUE TO COVID-19 VIRUS: ICD-10-CM

## 2021-12-01 DIAGNOSIS — J12.82 PNEUMONIA DUE TO COVID-19 VIRUS: ICD-10-CM

## 2021-12-01 LAB
ALBUMIN SERPL-MCNC: 3.61 G/DL (ref 3.5–5.2)
ALBUMIN/GLOB SERPL: 1 G/DL
ALP SERPL-CCNC: 119 U/L (ref 39–117)
ALT SERPL W P-5'-P-CCNC: 109 U/L (ref 1–33)
ANION GAP SERPL CALCULATED.3IONS-SCNC: 12.7 MMOL/L (ref 5–15)
AST SERPL-CCNC: 62 U/L (ref 1–32)
B PARAPERT DNA SPEC QL NAA+PROBE: NOT DETECTED
B PERT DNA SPEC QL NAA+PROBE: NOT DETECTED
BASOPHILS # BLD AUTO: 0.02 10*3/MM3 (ref 0–0.2)
BASOPHILS NFR BLD AUTO: 0.3 % (ref 0–1.5)
BILIRUB SERPL-MCNC: 0.7 MG/DL (ref 0–1.2)
BUN SERPL-MCNC: 10 MG/DL (ref 6–20)
BUN/CREAT SERPL: 12 (ref 7–25)
C PNEUM DNA NPH QL NAA+NON-PROBE: NOT DETECTED
CALCIUM SPEC-SCNC: 8.8 MG/DL (ref 8.6–10.5)
CHLORIDE SERPL-SCNC: 104 MMOL/L (ref 98–107)
CO2 SERPL-SCNC: 23.3 MMOL/L (ref 22–29)
CREAT SERPL-MCNC: 0.83 MG/DL (ref 0.57–1)
CRP SERPL-MCNC: 1.62 MG/DL (ref 0–0.5)
D DIMER PPP FEU-MCNC: 0.6 MCGFEU/ML (ref 0–0.5)
D-LACTATE SERPL-SCNC: 1.4 MMOL/L (ref 0.5–2)
DEPRECATED RDW RBC AUTO: 44.4 FL (ref 37–54)
EOSINOPHIL # BLD AUTO: 0.01 10*3/MM3 (ref 0–0.4)
EOSINOPHIL NFR BLD AUTO: 0.2 % (ref 0.3–6.2)
ERYTHROCYTE [DISTWIDTH] IN BLOOD BY AUTOMATED COUNT: 13.8 % (ref 12.3–15.4)
FERRITIN SERPL-MCNC: 669.9 NG/ML (ref 13–150)
FLUAV SUBTYP SPEC NAA+PROBE: NOT DETECTED
FLUBV RNA ISLT QL NAA+PROBE: NOT DETECTED
GFR SERPL CREATININE-BSD FRML MDRD: 75 ML/MIN/1.73
GLOBULIN UR ELPH-MCNC: 3.8 GM/DL
GLUCOSE SERPL-MCNC: 99 MG/DL (ref 65–99)
HADV DNA SPEC NAA+PROBE: NOT DETECTED
HCOV 229E RNA SPEC QL NAA+PROBE: NOT DETECTED
HCOV HKU1 RNA SPEC QL NAA+PROBE: NOT DETECTED
HCOV NL63 RNA SPEC QL NAA+PROBE: NOT DETECTED
HCOV OC43 RNA SPEC QL NAA+PROBE: NOT DETECTED
HCT VFR BLD AUTO: 43.7 % (ref 34–46.6)
HGB BLD-MCNC: 13.9 G/DL (ref 12–15.9)
HMPV RNA NPH QL NAA+NON-PROBE: NOT DETECTED
HPIV1 RNA ISLT QL NAA+PROBE: NOT DETECTED
HPIV2 RNA SPEC QL NAA+PROBE: NOT DETECTED
HPIV3 RNA NPH QL NAA+PROBE: NOT DETECTED
HPIV4 P GENE NPH QL NAA+PROBE: NOT DETECTED
IMM GRANULOCYTES # BLD AUTO: 0.07 10*3/MM3 (ref 0–0.05)
IMM GRANULOCYTES NFR BLD AUTO: 1.1 % (ref 0–0.5)
LDH SERPL-CCNC: 411 U/L (ref 135–214)
LYMPHOCYTES # BLD AUTO: 1.96 10*3/MM3 (ref 0.7–3.1)
LYMPHOCYTES NFR BLD AUTO: 30.6 % (ref 19.6–45.3)
M PNEUMO IGG SER IA-ACNC: NOT DETECTED
MCH RBC QN AUTO: 27.7 PG (ref 26.6–33)
MCHC RBC AUTO-ENTMCNC: 31.8 G/DL (ref 31.5–35.7)
MCV RBC AUTO: 87.2 FL (ref 79–97)
MONOCYTES # BLD AUTO: 0.35 10*3/MM3 (ref 0.1–0.9)
MONOCYTES NFR BLD AUTO: 5.5 % (ref 5–12)
NEUTROPHILS NFR BLD AUTO: 3.99 10*3/MM3 (ref 1.7–7)
NEUTROPHILS NFR BLD AUTO: 62.3 % (ref 42.7–76)
NRBC BLD AUTO-RTO: 0 /100 WBC (ref 0–0.2)
PLATELET # BLD AUTO: 202 10*3/MM3 (ref 140–450)
PMV BLD AUTO: 11.6 FL (ref 6–12)
POTASSIUM SERPL-SCNC: 3.3 MMOL/L (ref 3.5–5.2)
PROT SERPL-MCNC: 7.4 G/DL (ref 6–8.5)
RBC # BLD AUTO: 5.01 10*6/MM3 (ref 3.77–5.28)
RHINOVIRUS RNA SPEC NAA+PROBE: NOT DETECTED
RSV RNA NPH QL NAA+NON-PROBE: NOT DETECTED
SARS-COV-2 RNA NPH QL NAA+NON-PROBE: DETECTED
SODIUM SERPL-SCNC: 140 MMOL/L (ref 136–145)
TROPONIN T SERPL-MCNC: <0.01 NG/ML (ref 0–0.03)
WBC NRBC COR # BLD: 6.4 10*3/MM3 (ref 3.4–10.8)

## 2021-12-01 PROCEDURE — 93010 ELECTROCARDIOGRAM REPORT: CPT | Performed by: INTERNAL MEDICINE

## 2021-12-01 PROCEDURE — 99283 EMERGENCY DEPT VISIT LOW MDM: CPT

## 2021-12-01 PROCEDURE — 84484 ASSAY OF TROPONIN QUANT: CPT | Performed by: EMERGENCY MEDICINE

## 2021-12-01 PROCEDURE — 71045 X-RAY EXAM CHEST 1 VIEW: CPT | Performed by: RADIOLOGY

## 2021-12-01 PROCEDURE — 25010000002 METHYLPREDNISOLONE PER 125 MG: Performed by: EMERGENCY MEDICINE

## 2021-12-01 PROCEDURE — 80053 COMPREHEN METABOLIC PANEL: CPT | Performed by: PHYSICIAN ASSISTANT

## 2021-12-01 PROCEDURE — 25010000002 DEXAMETHASONE PER 1 MG: Performed by: EMERGENCY MEDICINE

## 2021-12-01 PROCEDURE — 83605 ASSAY OF LACTIC ACID: CPT | Performed by: PHYSICIAN ASSISTANT

## 2021-12-01 PROCEDURE — 94799 UNLISTED PULMONARY SVC/PX: CPT

## 2021-12-01 PROCEDURE — 94640 AIRWAY INHALATION TREATMENT: CPT

## 2021-12-01 PROCEDURE — 93005 ELECTROCARDIOGRAM TRACING: CPT | Performed by: EMERGENCY MEDICINE

## 2021-12-01 PROCEDURE — 0 IOPAMIDOL PER 1 ML: Performed by: EMERGENCY MEDICINE

## 2021-12-01 PROCEDURE — 86140 C-REACTIVE PROTEIN: CPT | Performed by: PHYSICIAN ASSISTANT

## 2021-12-01 PROCEDURE — 96374 THER/PROPH/DIAG INJ IV PUSH: CPT

## 2021-12-01 PROCEDURE — 36415 COLL VENOUS BLD VENIPUNCTURE: CPT

## 2021-12-01 PROCEDURE — 82728 ASSAY OF FERRITIN: CPT | Performed by: PHYSICIAN ASSISTANT

## 2021-12-01 PROCEDURE — 84145 PROCALCITONIN (PCT): CPT | Performed by: PHYSICIAN ASSISTANT

## 2021-12-01 PROCEDURE — 83615 LACTATE (LD) (LDH) ENZYME: CPT | Performed by: PHYSICIAN ASSISTANT

## 2021-12-01 PROCEDURE — 85379 FIBRIN DEGRADATION QUANT: CPT | Performed by: PHYSICIAN ASSISTANT

## 2021-12-01 PROCEDURE — 71275 CT ANGIOGRAPHY CHEST: CPT

## 2021-12-01 PROCEDURE — 71045 X-RAY EXAM CHEST 1 VIEW: CPT

## 2021-12-01 PROCEDURE — 85025 COMPLETE CBC W/AUTO DIFF WBC: CPT | Performed by: PHYSICIAN ASSISTANT

## 2021-12-01 PROCEDURE — 25010000002 INJECTION, CASIRIVIMAB AND IMDEVIMAB, 1200 MG: Performed by: EMERGENCY MEDICINE

## 2021-12-01 PROCEDURE — 0202U NFCT DS 22 TRGT SARS-COV-2: CPT | Performed by: PHYSICIAN ASSISTANT

## 2021-12-01 PROCEDURE — M0243 CASIRIVI AND IMDEVI INFUSION: HCPCS | Performed by: EMERGENCY MEDICINE

## 2021-12-01 RX ORDER — DIPHENHYDRAMINE HCL 50 MG
50 CAPSULE ORAL ONCE AS NEEDED
Status: DISCONTINUED | OUTPATIENT
Start: 2021-12-01 | End: 2021-12-02 | Stop reason: HOSPADM

## 2021-12-01 RX ORDER — DEXAMETHASONE SODIUM PHOSPHATE 10 MG/ML
10 INJECTION INTRAMUSCULAR; INTRAVENOUS ONCE
Status: COMPLETED | OUTPATIENT
Start: 2021-12-01 | End: 2021-12-01

## 2021-12-01 RX ORDER — DIPHENHYDRAMINE HYDROCHLORIDE 50 MG/ML
50 INJECTION INTRAMUSCULAR; INTRAVENOUS ONCE AS NEEDED
Status: DISCONTINUED | OUTPATIENT
Start: 2021-12-01 | End: 2021-12-02 | Stop reason: HOSPADM

## 2021-12-01 RX ORDER — POTASSIUM CHLORIDE 20 MEQ/1
20 TABLET, EXTENDED RELEASE ORAL ONCE
Status: COMPLETED | OUTPATIENT
Start: 2021-12-01 | End: 2021-12-01

## 2021-12-01 RX ORDER — SODIUM CHLORIDE 9 MG/ML
30 INJECTION, SOLUTION INTRAVENOUS ONCE
Status: COMPLETED | OUTPATIENT
Start: 2021-12-01 | End: 2021-12-01

## 2021-12-01 RX ORDER — IPRATROPIUM BROMIDE AND ALBUTEROL SULFATE 2.5; .5 MG/3ML; MG/3ML
3 SOLUTION RESPIRATORY (INHALATION) ONCE
Status: COMPLETED | OUTPATIENT
Start: 2021-12-01 | End: 2021-12-01

## 2021-12-01 RX ORDER — EPINEPHRINE 1 MG/ML
0.3 INJECTION, SOLUTION INTRAMUSCULAR; SUBCUTANEOUS ONCE AS NEEDED
Status: DISCONTINUED | OUTPATIENT
Start: 2021-12-01 | End: 2021-12-02 | Stop reason: HOSPADM

## 2021-12-01 RX ORDER — METHYLPREDNISOLONE SODIUM SUCCINATE 125 MG/2ML
125 INJECTION, POWDER, LYOPHILIZED, FOR SOLUTION INTRAMUSCULAR; INTRAVENOUS ONCE AS NEEDED
Status: DISCONTINUED | OUTPATIENT
Start: 2021-12-01 | End: 2021-12-02 | Stop reason: HOSPADM

## 2021-12-01 RX ORDER — METHYLPREDNISOLONE SODIUM SUCCINATE 125 MG/2ML
125 INJECTION, POWDER, LYOPHILIZED, FOR SOLUTION INTRAMUSCULAR; INTRAVENOUS ONCE
Status: DISCONTINUED | OUTPATIENT
Start: 2021-12-01 | End: 2021-12-02 | Stop reason: HOSPADM

## 2021-12-01 RX ADMIN — IPRATROPIUM BROMIDE AND ALBUTEROL SULFATE 3 ML: .5; 3 SOLUTION RESPIRATORY (INHALATION) at 21:13

## 2021-12-01 RX ADMIN — SODIUM CHLORIDE 30 ML: 9 INJECTION, SOLUTION INTRAVENOUS at 23:43

## 2021-12-01 RX ADMIN — IOPAMIDOL 80 ML: 755 INJECTION, SOLUTION INTRAVENOUS at 21:51

## 2021-12-01 RX ADMIN — DEXAMETHASONE SODIUM PHOSPHATE 10 MG: 10 INJECTION INTRAMUSCULAR; INTRAVENOUS at 21:13

## 2021-12-01 RX ADMIN — POTASSIUM CHLORIDE 20 MEQ: 20 TABLET, EXTENDED RELEASE ORAL at 21:13

## 2021-12-01 RX ADMIN — CASIRIVIMAB AND IMDEVIMAB: 600; 600 INJECTION, SOLUTION, CONCENTRATE INTRAVENOUS at 23:19

## 2021-12-01 NOTE — ED NOTES
MEDICAL SCREENING:    Reason for Visit: sob, diarrhea. Abnormal chest x ray    Patient initially seen in triage.  The patient was advised further evaluation and diagnostic testing will be needed, some of the treatment and testing will be initiated in the lobby in order to begin the process.  The patient will be returned to the waiting area for the time being and possibly be re-assessed by a subsequent ED provider.  The patient will be brought back to the treatment area in as timely manner as possible.         Swapnil Simpson PA  12/01/21 1806

## 2021-12-02 VITALS
WEIGHT: 213 LBS | RESPIRATION RATE: 14 BRPM | TEMPERATURE: 98.7 F | HEART RATE: 71 BPM | BODY MASS INDEX: 32.28 KG/M2 | HEIGHT: 68 IN | DIASTOLIC BLOOD PRESSURE: 81 MMHG | SYSTOLIC BLOOD PRESSURE: 112 MMHG | OXYGEN SATURATION: 98 %

## 2021-12-02 LAB — PROCALCITONIN SERPL-MCNC: 0.07 NG/ML (ref 0–0.25)

## 2021-12-02 RX ORDER — DEXAMETHASONE 6 MG/1
6 TABLET ORAL DAILY
Qty: 10 TABLET | Refills: 0 | Status: SHIPPED | OUTPATIENT
Start: 2021-12-02 | End: 2021-12-12

## 2021-12-02 NOTE — ED NOTES
Patient re-assessed at this time. Patient informed she would be placed in a room as soon as possible. MICHELLE. Sheree Griffin RN  12/01/21 1944

## 2021-12-03 LAB
QT INTERVAL: 458 MS
QTC INTERVAL: 487 MS

## 2021-12-06 NOTE — ED PROVIDER NOTES
Subjective     History provided by:  Patient   used: No    Shortness of Breath  Severity:  Mild  Onset quality:  Gradual  Timing:  Constant  Progression:  Worsening  Chronicity:  New  Context: activity    Context: not animal exposure, not emotional upset, not fumes, not known allergens, not occupational exposure, not pollens, not smoke exposure, not strong odors, not URI and not weather changes    Relieved by:  Nothing  Worsened by:  Activity, coughing, deep breathing, exertion and movement  Ineffective treatments:  None tried  Associated symptoms: no abdominal pain, no chest pain, no claudication, no cough, no diaphoresis, no ear pain, no fever, no headaches, no hemoptysis, no neck pain, no PND, no rash, no sore throat, no sputum production, no syncope, no swollen glands, no vomiting and no wheezing    Risk factors: no recent alcohol use, no family hx of DVT, no hx of cancer, no hx of PE/DVT, no obesity, no oral contraceptive use, no prolonged immobilization, no recent surgery and no tobacco use        Review of Systems   Constitutional: Negative for activity change, appetite change, chills, diaphoresis, fatigue and fever.   HENT: Negative for congestion, ear pain and sore throat.    Eyes: Negative for redness.   Respiratory: Positive for shortness of breath. Negative for cough, hemoptysis, sputum production, chest tightness and wheezing.    Cardiovascular: Negative for chest pain, palpitations, claudication, leg swelling, syncope and PND.   Gastrointestinal: Negative for abdominal pain, diarrhea, nausea and vomiting.   Genitourinary: Negative for dysuria and urgency.   Musculoskeletal: Negative for arthralgias, back pain, myalgias and neck pain.   Skin: Negative for pallor, rash and wound.   Neurological: Negative for dizziness, speech difficulty, weakness and headaches.   Psychiatric/Behavioral: Negative for agitation, behavioral problems, confusion and decreased concentration.   All other  systems reviewed and are negative.      Past Medical History:   Diagnosis Date   • Hyperlipidemia    • Pott's disease    • Thyroid disorder        No Known Allergies    Past Surgical History:   Procedure Laterality Date   • CHOLECYSTECTOMY     • CYST REMOVAL      back,   • HYSTERECTOMY         Family History   Problem Relation Age of Onset   • Breast cancer Maternal Grandmother    • Breast cancer Maternal Aunt    • Breast cancer Maternal Aunt    • Heart disease Maternal Aunt    • Cancer Mother    • Thyroid disease Mother    • Heart disease Father    • No Known Problems Sister    • Hypertension Brother    • Hyperlipidemia Brother    • Stroke Maternal Grandfather    • Hyperlipidemia Maternal Grandfather    • Diabetes Maternal Grandfather    • Heart disease Maternal Grandfather        Social History     Socioeconomic History   • Marital status:    Tobacco Use   • Smoking status: Never Smoker   • Smokeless tobacco: Never Used   Substance and Sexual Activity   • Alcohol use: No   • Drug use: No   • Sexual activity: Defer     Partners: Male           Objective   Physical Exam  Vitals and nursing note reviewed.   Constitutional:       General: She is not in acute distress.     Appearance: Normal appearance. She is well-developed. She is not toxic-appearing or diaphoretic.   HENT:      Head: Normocephalic and atraumatic.      Right Ear: External ear normal.      Left Ear: External ear normal.      Nose: Nose normal.      Mouth/Throat:      Pharynx: No oropharyngeal exudate.      Tonsils: No tonsillar exudate.   Eyes:      General: Lids are normal.      Conjunctiva/sclera: Conjunctivae normal.      Pupils: Pupils are equal, round, and reactive to light.   Neck:      Thyroid: No thyromegaly.   Cardiovascular:      Rate and Rhythm: Normal rate and regular rhythm.      Pulses: Normal pulses.      Heart sounds: Normal heart sounds, S1 normal and S2 normal.   Pulmonary:      Effort: Pulmonary effort is normal. No tachypnea  or respiratory distress.      Breath sounds: Examination of the right-upper field reveals decreased breath sounds. Examination of the left-upper field reveals decreased breath sounds. Examination of the right-middle field reveals decreased breath sounds. Examination of the left-middle field reveals decreased breath sounds. Examination of the right-lower field reveals decreased breath sounds. Examination of the left-lower field reveals decreased breath sounds. Decreased breath sounds present. No wheezing or rales.   Chest:      Chest wall: No tenderness.   Abdominal:      General: Bowel sounds are normal. There is no distension.      Palpations: Abdomen is soft.      Tenderness: There is no abdominal tenderness. There is no guarding or rebound.   Musculoskeletal:         General: No tenderness or deformity. Normal range of motion.      Cervical back: Full passive range of motion without pain, normal range of motion and neck supple.   Lymphadenopathy:      Cervical: No cervical adenopathy.   Skin:     General: Skin is warm and dry.      Coloration: Skin is not pale.      Findings: No erythema or rash.   Neurological:      Mental Status: She is alert and oriented to person, place, and time.      GCS: GCS eye subscore is 4. GCS verbal subscore is 5. GCS motor subscore is 6.      Cranial Nerves: No cranial nerve deficit.      Sensory: No sensory deficit.   Psychiatric:         Speech: Speech normal.         Behavior: Behavior normal.         Thought Content: Thought content normal.         Judgment: Judgment normal.         Procedures           ED Course  ED Course as of 12/06/21 0031   Wed Dec 01, 2021   2101 XR Chest AP  IMPRESSION:  Bilateral airspace disease [ES]   2243 CT Chest Pulmonary Embolism  IMPRESSION:     1. No PE or aortic dissection.  2. Multifocal pneumonia typical of Covid 19.  3. Mild right hilar and mediastinal adenopathy, probably reactive. Attention on three-month follow-up chest CT is recommended.  [ES]   Mon Dec 06, 2021   0030 ECG 12 Lead  Vent. Rate :  68 BPM     Atrial Rate :  68 BPM     P-R Int : 132 ms          QRS Dur :  82 ms      QT Int : 458 ms       P-R-T Axes :  22  -9 -25 degrees     QTc Int : 487 ms     Normal sinus rhythm  T wave abnormality, consider anterolateral ischemia  Prolonged QT  Abnormal ECG  When compared with ECG of 27-FEB-2018 10:31,  Inverted T waves have replaced nonspecific T wave abnormality in Inferior leads  T wave inversion more evident in Anterolateral leads [ES]      ED Course User Index  [ES] Dago Soliman MD                                                 MDM  Number of Diagnoses or Management Options  COVID-19: new and requires workup  Pneumonia due to COVID-19 virus: new and requires workup     Amount and/or Complexity of Data Reviewed  Clinical lab tests: reviewed and ordered  Tests in the radiology section of CPT®: reviewed and ordered  Tests in the medicine section of CPT®: reviewed and ordered  Review and summarize past medical records: yes  Independent visualization of images, tracings, or specimens: yes    Risk of Complications, Morbidity, and/or Mortality  Presenting problems: moderate  Diagnostic procedures: moderate  Management options: moderate    Patient Progress  Patient progress: stable      Final diagnoses:   COVID-19   Pneumonia due to COVID-19 virus       ED Disposition  ED Disposition     ED Disposition Condition Comment    Discharge Stable           Jefferson Patino PA-C  [address] [place] [address]  [phone]    Schedule an appointment as soon as possible for a visit in 1 day  EVALUATE         Medication List      New Prescriptions    dexamethasone 6 MG tablet  Commonly known as: DECADRON  Take 1 tablet by mouth Daily for 10 days.           Where to Get Your Medications      These medications were sent to [place] [address] - [phone] [facility] [phone] [address]  [place] [facility] [address]    Phone: [phone]   · dexamethasone 6 MG tablet          Dago Soliman MD  12/06/21 0031

## 2024-07-05 ENCOUNTER — TRANSCRIBE ORDERS (OUTPATIENT)
Dept: ADMINISTRATIVE | Facility: HOSPITAL | Age: 44
End: 2024-07-05
Payer: COMMERCIAL

## 2024-07-05 DIAGNOSIS — R10.31 ABDOMINAL PAIN, RIGHT LOWER QUADRANT: Primary | ICD-10-CM

## 2024-07-16 ENCOUNTER — HOSPITAL ENCOUNTER (OUTPATIENT)
Dept: CT IMAGING | Facility: HOSPITAL | Age: 44
Discharge: HOME | End: 2024-07-16
Admitting: PHYSICIAN ASSISTANT
Payer: COMMERCIAL

## 2024-07-16 DIAGNOSIS — R10.31 ABDOMINAL PAIN, RIGHT LOWER QUADRANT: ICD-10-CM

## 2024-07-16 PROCEDURE — 74176 CT ABD & PELVIS W/O CONTRAST: CPT | Performed by: RADIOLOGY

## 2024-07-16 PROCEDURE — 74176 CT ABD & PELVIS W/O CONTRAST: CPT

## 2024-08-08 ENCOUNTER — HOSPITAL ENCOUNTER (OUTPATIENT)
Dept: GENERAL RADIOLOGY | Facility: HOSPITAL | Age: 44
Discharge: HOME | End: 2024-08-08
Admitting: NURSE PRACTITIONER
Payer: COMMERCIAL

## 2024-08-08 ENCOUNTER — OFFICE VISIT (OUTPATIENT)
Dept: UROLOGY | Facility: CLINIC | Age: 44
End: 2024-08-08
Payer: COMMERCIAL

## 2024-08-08 VITALS
HEART RATE: 76 BPM | DIASTOLIC BLOOD PRESSURE: 79 MMHG | BODY MASS INDEX: 29.73 KG/M2 | HEIGHT: 68 IN | SYSTOLIC BLOOD PRESSURE: 120 MMHG | WEIGHT: 196.2 LBS

## 2024-08-08 DIAGNOSIS — N20.1 RIGHT URETERAL STONE: Primary | ICD-10-CM

## 2024-08-08 DIAGNOSIS — R10.9 ACUTE RIGHT FLANK PAIN: ICD-10-CM

## 2024-08-08 DIAGNOSIS — R10.31 RIGHT LOWER QUADRANT ABDOMINAL PAIN: ICD-10-CM

## 2024-08-08 DIAGNOSIS — R35.0 FREQUENCY OF MICTURITION: ICD-10-CM

## 2024-08-08 LAB
BILIRUB BLD-MCNC: NEGATIVE MG/DL
CLARITY, POC: CLEAR
COLOR UR: YELLOW
EXPIRATION DATE: NORMAL
GLUCOSE UR STRIP-MCNC: NEGATIVE MG/DL
KETONES UR QL: NEGATIVE
LEUKOCYTE EST, POC: NEGATIVE
Lab: NORMAL
NITRITE UR-MCNC: NEGATIVE MG/ML
PH UR: 5.5 [PH] (ref 5–8)
PROT UR STRIP-MCNC: NEGATIVE MG/DL
RBC # UR STRIP: NEGATIVE /UL
SP GR UR: 1.02 (ref 1–1.03)
UROBILINOGEN UR QL: NORMAL

## 2024-08-08 PROCEDURE — 99204 OFFICE O/P NEW MOD 45 MIN: CPT | Performed by: NURSE PRACTITIONER

## 2024-08-08 PROCEDURE — 74018 RADEX ABDOMEN 1 VIEW: CPT

## 2024-08-08 PROCEDURE — 81003 URINALYSIS AUTO W/O SCOPE: CPT | Performed by: NURSE PRACTITIONER

## 2024-08-08 RX ORDER — ONDANSETRON 8 MG/1
8 TABLET, ORALLY DISINTEGRATING ORAL EVERY 8 HOURS PRN
COMMUNITY
Start: 2024-06-24

## 2024-08-08 RX ORDER — TOPIRAMATE 100 MG/1
1 TABLET, FILM COATED ORAL DAILY
COMMUNITY
Start: 2024-06-24

## 2024-08-08 RX ORDER — HYDROCODONE BITARTRATE AND ACETAMINOPHEN 5; 325 MG/1; MG/1
1 TABLET ORAL EVERY 12 HOURS PRN
COMMUNITY
Start: 2024-07-30

## 2024-08-08 RX ORDER — PANTOPRAZOLE SODIUM 40 MG/1
40 TABLET, DELAYED RELEASE ORAL DAILY
COMMUNITY
Start: 2024-06-24

## 2024-08-08 RX ORDER — PRAVASTATIN SODIUM 40 MG
40 TABLET ORAL DAILY
COMMUNITY

## 2024-08-08 RX ORDER — RIZATRIPTAN BENZOATE 10 MG/1
10 TABLET ORAL ONCE AS NEEDED
COMMUNITY
Start: 2024-06-24

## 2024-08-08 RX ORDER — TOLTERODINE 2 MG/1
2 CAPSULE, EXTENDED RELEASE ORAL DAILY
COMMUNITY
Start: 2024-06-24

## 2024-08-08 RX ORDER — TAMSULOSIN HYDROCHLORIDE 0.4 MG/1
1 CAPSULE ORAL DAILY
Qty: 30 CAPSULE | Refills: 5 | Status: SHIPPED | OUTPATIENT
Start: 2024-08-08

## 2024-08-08 RX ORDER — IBUPROFEN 800 MG/1
800 TABLET ORAL EVERY 8 HOURS PRN
Qty: 30 TABLET | Refills: 1 | Status: SHIPPED | OUTPATIENT
Start: 2024-08-08

## 2024-08-08 RX ORDER — ONDANSETRON 4 MG/1
4 TABLET, FILM COATED ORAL EVERY 12 HOURS PRN
Qty: 20 TABLET | Refills: 0 | Status: SHIPPED | OUTPATIENT
Start: 2024-08-08

## 2024-08-08 NOTE — H&P (VIEW-ONLY)
Chief Complaint  Right Renal Stone (NEW PATIENT WITH RIGHT PROXIMAL  URETERAL STONE/RT. FLANK/RLQ ABD PAIN)    Subjective          Vidhya Park presents to [facility] for RIGHT  5.7MM UPJ STONE/FLANK PAIN/RLQ PAIN  History of Present Illness   History of Present Illness  The patient is a pleasant 43-year-old female who presents to clinic today for evaluation as a new patient consult. She has been referred to clinic by her PCP for concerns of kidney stones. She was evaluated on 07/05/2024 secondary to persistent abdominal pain, WORSENING X 3 WEEKS.     She had reported pain that is colicky in nature, initially was intermittent but had progressed to sharp right lower quadrant abdominal pain radiating to her pelvic region, causing significant pelvic pressure and suprapubic discomfort. She also reported right flank pain and lower back pain. She reports nausea, no vomiting. She denied urinary symptoms such as frequency, urgency, and dysuria. She has a significant history of GERD,IBS-C, hyperlipidemia, and RICH disease.     On clinic evaluation today, she is in no apparent discomfort, reports pain had improved 2/10.SHE still has intermittent right flank pain, RLQ abdominal pain and discomfort with pelvic pressure. However Denies dysuria or burning with urination. She does not have recurrent UTIs. Urinalysis is completely negative for leukocyte esterase. It is negative for nitrites. It is negative for growth/microscopic hematuria.PVR-0ml    She had a  A CT scan  which revealed a 5.7 mm right proximal ureteral kidney stone, a condition she has never experienced before. She is unsure if she has passed the stone. She experiences frequent nausea but denies any vomiting.     She has been diagnosed with diverticulitis and Crohn's disease. She also suffers from IBS-Constipation. If she does not have a bowel movement for 3 days, she takes medication. STATES  Her gastroenterologist  "advised her to abstain from bowel cleaning for 3 days. She underwent a colonoscopy and does not believe she had polyps this time, although she did have polyps during her previous colonoscopy. She denies passing excessive gas.     Supplemental Information  She had a hysterectomy when she was 21 years old because she started getting forms of cancer.    FINDINGS: KUB 08/08/2024 -2 views of the abdomen SHOWED  Moderate to large stool burden.  Calcification projecting between the right L1 and L2 transverse , processes likely right proximal ureteral stone.     IMPRESSION: Appearance suggestive of right proximal ureteral stone    MPRESSION:CT ABDOMEN/PELVIS 07/16/24   1. Large 5 MM calcification in the proximal right ureter but no abnormal dilatation at the time of the study   2. Other findings as above    Active Ambulatory Problems     Diagnosis Date Noted    Palpitations 02/27/2018    Dizziness 02/27/2018    Syncope 02/27/2018    Acquired hypothyroidism 02/27/2018    Lipoma 02/27/2018    Anxiety 02/27/2018    GERD (gastroesophageal reflux disease) 02/27/2018    PVC's (premature ventricular contractions) 02/27/2018    Mixed hyperlipidemia 02/27/2018    Precordial pain 03/09/2020    Shortness of breath 03/09/2020    Right ureteral stone 08/08/2024    Acute right flank pain 08/08/2024    Right lower quadrant abdominal pain 08/08/2024    Frequency of micturition 08/08/2024     Resolved Ambulatory Problems     Diagnosis Date Noted    No Resolved Ambulatory Problems     Past Medical History:   Diagnosis Date    Hyperlipidemia     Pott's disease     Thyroid disorder       Objective   Vital Signs:   /79 (BP Location: Right arm, Patient Position: Sitting, Cuff Size: Adult)   Pulse 76   Ht 172.7 cm (67.99\")   Wt 89 kg (196 lb 3.2 oz)   BMI 29.84 kg/m²       ROS:   Review of Systems   Constitutional:  Positive for activity change, appetite change, fatigue and unexpected weight gain. Negative for diaphoresis, fever and " unexpected weight loss.   HENT: Negative.  Negative for congestion, ear discharge, ear pain, nosebleeds, rhinorrhea, sinus pressure and sore throat.    Eyes: Negative.  Negative for blurred vision, double vision, photophobia, pain, redness and visual disturbance.   Respiratory: Negative.  Negative for apnea, cough, chest tightness, shortness of breath, wheezing and stridor.    Cardiovascular: Negative.  Negative for chest pain and palpitations.   Gastrointestinal:  Positive for abdominal distention, abdominal pain (RLQ) and constipation. Negative for diarrhea, nausea and vomiting.   Endocrine: Negative.  Negative for polydipsia, polyphagia and polyuria.   Genitourinary:  Positive for flank pain. Negative for decreased urine volume, difficulty urinating, dyspareunia, dysuria, frequency, genital sores, hematuria, pelvic pain, pelvic pressure, urgency, urinary incontinence and vaginal discharge.   Musculoskeletal:  Negative for arthralgias and joint swelling.   Skin:  Positive for dry skin and pallor. Negative for rash and wound.   Allergic/Immunologic: Negative.    Neurological: Negative.  Negative for dizziness, tremors, syncope, weakness and light-headedness.   Hematological: Negative.    Psychiatric/Behavioral:  Positive for sleep disturbance and stress. Negative for behavioral problems and decreased concentration.         Physical Exam  Constitutional:       General: She is in acute distress.      Appearance: She is well-developed. She is obese. She is ill-appearing.   HENT:      Head: Normocephalic and atraumatic.   Eyes:      Pupils: Pupils are equal, round, and reactive to light.   Neck:      Thyroid: No thyromegaly.      Trachea: No tracheal deviation.   Cardiovascular:      Rate and Rhythm: Normal rate and regular rhythm.      Heart sounds: No murmur heard.  Pulmonary:      Effort: Pulmonary effort is normal. No respiratory distress.      Breath sounds: Normal breath sounds. No stridor. No wheezing.    Abdominal:      General: Bowel sounds are normal. There is distension.      Palpations: Abdomen is soft.      Tenderness: Tenderness: RLQ.   Genitourinary:     Labia:         Right: No tenderness.         Left: No tenderness.       Vagina: Normal. No vaginal discharge.   Musculoskeletal:         General: Tenderness present. No deformity. Normal range of motion.      Cervical back: Normal range of motion.   Skin:     General: Skin is warm and dry.      Capillary Refill: Capillary refill takes less than 2 seconds.      Coloration: Skin is not pale.      Findings: No erythema or rash.   Neurological:      Mental Status: She is alert and oriented to person, place, and time.      Cranial Nerves: No cranial nerve deficit.      Sensory: No sensory deficit.      Coordination: Coordination normal.   Psychiatric:         Behavior: Behavior normal.         Thought Content: Thought content normal.         Judgment: Judgment normal.        Physical Exam    Result Review :     UA          8/8/2024    08:56   Urinalysis   Ketones, UA Negative    Leukocytes, UA Negative             Assessment and Plan    Problem List Items Addressed This Visit          Gastrointestinal Abdominal     Acute right flank pain    Relevant Medications    tamsulosin (FLOMAX) 0.4 MG capsule 24 hr capsule    ondansetron (Zofran) 4 MG tablet    ibuprofen (ADVIL,MOTRIN) 800 MG tablet    Other Relevant Orders    XR Abdomen KUB (Completed)    Case Request (Completed)    Right lower quadrant abdominal pain    Relevant Orders    Case Request (Completed)       Genitourinary and Reproductive     Right ureteral stone - Primary    Relevant Medications    tamsulosin (FLOMAX) 0.4 MG capsule 24 hr capsule    ondansetron (Zofran) 4 MG tablet    ibuprofen (ADVIL,MOTRIN) 800 MG tablet    Other Relevant Orders    XR Abdomen KUB (Completed)    Case Request (Completed)    Frequency of micturition    Relevant Orders    POC Urinalysis Dipstick, Automated (Completed)    XR  Abdomen KUB (Completed)       Assessment & Plan                   ASSESSMENT              RIGHT PROXIMAL URETERAL STONE/FLANK/ABDOMINAL PAIN  MS SG CONNELLY IS A a pleasant 43-year-old female who presents to clinic today for evaluation FOR RIGHT PROXIMAL 5.7 MM UPJ STONE . SHE HAD CT ABDOMEN 07/05/24, Secondary to persistent abdominal pain. She had reported pain that is colicky in nature, initially was intermittent but had progressed to sharp right lower quadrant abdominal pain radiating to her pelvic region, causing significant pelvic pressure and suprapubic discomfort. She also reported right flank pain and lower back pain. She reports nausea, no vomiting. She denied urinary symptoms such as frequency, urgency, and dysuria. She has a significant history of GERD,IBS-C, hyperlipidemia, and RICH disease..     SHe presents to clinic today IN NO APPARENT DISCOMFORT. HOWEVER STILL complaints of  INTERMITTENT Severe RLQ ABDOMINA PAIN, INTERMITTENT RIGHT>LEFT Flank pain that is still very colicky in nature,  but become very bothersome to her. KUB  TODAY 08/08/24 CONFIRMED UPJ STONE STILL IN SITU,    We have discussed the various parameters regarding spontaneous passage including the notion that a tiny 1 to 4 mm stone, has a 90% high likelihood of spontaneous passage versus a larger stone>6MM LIKE SHE HAS  being caught up in the upper areas of the urinary tract. We also discussed the medical management of stone disease and the use of medical expulsive therapy in the form of Flomax. This is used in an off label setting.     I also talked about nonoperative management including ambulation and increasing fluids and hot tub as being an effective adjuncts in the treatment of a ureteral stone. We discussed the presence of the stone. We discussed the various therapeutic options available including lithotripsy. We discussed the indicators for intervention including  absolute indicators such as sepsis and uncontrollable severe  pain as well as  the relative indicators of moderate pain that is well-controlled with various analgesia.     ESWL-the patient is a candidate for extracorporeal shockwave lithotripsy.  We discussed the type of stone, And the complications associated with the procedure including but not limited to pain in the flank, hematoma, spontaneous renal hemorrhage, and adequate fragmentation of stones.  The need for passage of the stones.  The need for concomitant additional procedures in the range of 24% the risk of a distal fragment in the range of 3% requiring ureteroscopy.      PLAN.    THE PATIENT IS NOW DESIROUS OF SURGICAL INTERVENTION AT THIS TIME      Patient has been scheduled for RIGHT ESWL - EXTRACORPOREAL SHOCKWAVE LITHOTRIPSY PROCEDURE ON FRIDAY 08/16/24  W. DR SOTELO      Patient HAS Adequate Narcotic pain medication HYDROCODONE 7.5mg to use for severe pain.    SHe has been given nonnarcotic pain medication and advised to continue with NSAIDS for breakthrough pain as needed.  Also nausea medicine, Flomax for medical expulsive therapy.    We discussed  OTHER treatment options for HER BACK/FLANK pain with patient encouraged to continue conservative therapy alternating NSAID/Tylenol as tolerated, Also including hot baths, showers, warm compresses to lower back as tolerated. Also encouraged walking, physical therapy, light exercises as tolerated    Rest is the most important treatment in the case of BACK/FLANK pain. Rest and physical therapy are enough to improve minor pain. Discussed to monitor his daily routine with prevention of flank pain by: At least Drinking 8 glasses of water per day, Limiting your alcohol consumption.  Having a healthy diet containing fruits, vegetables, and a lot of fluids, Practicing safe sex.  Also maintaining proper hygiene of your body as well as the environment.    Discussed a kidney stone prevention diet to include increasing p.o. fluid intake, to at least 1 to 2 L of water daily.   She is to avoid caffeine products such as cola, coffee, and to avoid soft or soda drinks.  She is to decrease her sodium consumption as in  Fast foods, johnson, salted nuts, canned foods, and smoked/cured foods. She is also to decrease her oxalate consumption, as in spinach, Adriano greens, and Rhubarb.  Also important is to decrease protein intake, as in red meats, peanut butter, and also avoid nuts.    FOLLOW UP WITH GI-ABDOMINAL PAIN, IBS-C    FOLLOW UP IN CLINIC POST PROCEDURE,      Patient is Agreeable to plan of care     1. Kidney stones/FLANK/RLQ PAIN-SUMMARY.  She is not having any pain. She will continue conservative therapy. The patient is also on adequate narcotic pain management with hydrocodone 5/325 per PCP. I encouraged alternating NSAIDs, Tylenol, Motrin. I also encouraged taking tamsulosin 0.4 mg daily and nausea medicine as indicated. I advised her to increase her oral fluid intake to at least 1 to 2 liters. If the stone is still in sitter, I will schedule the patient for surgical intervention, extracorporeal shockwave lithotripsy versus right ureteroscopic laser lithotripsy with Dr. Moore.    Follow-up  The patient will follow up in clinic post procedure, may return sooner if need be or go to ER if any worsening pain.  Patient reports that she is not currently experiencing any symptoms of urinary incontinence.      BMI is >= 25 and <30. (Overweight) The following options were offered after discussion;: weight loss educational material (shared in after visit summary), exercise counseling/recommendations, and nutrition counseling/recommendations      RADIOLOGY (CT AND/OR KUB):    CT Abdomen and Pelvis: No results found for this or any previous visit.     CT Stone Protocol: No results found for this or any previous visit.     KUB: No results found for this or any previous visit.       [unfilled]  LABS (3 MONTHS):    Office Visit on 08/08/2024   Component Date Value Ref Range Status    Color  08/08/2024 Yellow  Yellow, Straw, Dark Yellow, Kylee Final    Clarity, UA 08/08/2024 Clear  Clear Final    Specific Gravity  08/08/2024 1.020  1.005 - 1.030 Final    pH, Urine 08/08/2024 5.5  5.0 - 8.0 Final    Leukocytes 08/08/2024 Negative  Negative Final    Nitrite, UA 08/08/2024 Negative  Negative Final    Protein, POC 08/08/2024 Negative  Negative mg/dL Final    Glucose, UA 08/08/2024 Negative  Negative mg/dL Final    Ketones, UA 08/08/2024 Negative  Negative Final    Urobilinogen, UA 08/08/2024 Normal  Normal, 0.2 E.U./dL Final    Bilirubin 08/08/2024 Negative  Negative Final    Blood, UA 08/08/2024 Negative  Negative Final    Lot Number 08/08/2024 [id number]   Final    Expiration Date 08/08/2024 10/25/2024   Final          Follow Up   Return in 8 days (on 8/16/2024) for Next scheduled follow up, W Dr. Merritt IN OR FOR RIGHT ESWL-5.7MM UPJ STONE W. FLANK/RLQ ABD PAIN.    Patient was given instructions and counseling regarding her condition or for health maintenance advice. Please see specific information pulled into the AVS if appropriate.          This document has been electronically signed by Griselda Cheng-Akwa, APRN   August 8, 2024 14:18 EDT      Dictated Utilizing Dragon Dictation: Part of this note may be an electronic transcription/translation of spoken language to printed text using the Dragon Dictation System.      Patient or patient representative verbalized consent for the use of Ambient Listening during the visit with  Griselda Cheng-Akwa, APRN for chart documentation. 8/8/2024  09:33 EDT

## 2024-08-08 NOTE — PROGRESS NOTES
Chief Complaint  Right Renal Stone (NEW PATIENT WITH RIGHT PROXIMAL  URETERAL STONE/RT. FLANK/RLQ ABD PAIN)    Subjective          Vidhya Park presents to [facility] for RIGHT  5.7MM UPJ STONE/FLANK PAIN/RLQ PAIN  History of Present Illness   History of Present Illness  The patient is a pleasant 43-year-old female who presents to clinic today for evaluation as a new patient consult. She has been referred to clinic by her PCP for concerns of kidney stones. She was evaluated on 07/05/2024 secondary to persistent abdominal pain, WORSENING X 3 WEEKS.     She had reported pain that is colicky in nature, initially was intermittent but had progressed to sharp right lower quadrant abdominal pain radiating to her pelvic region, causing significant pelvic pressure and suprapubic discomfort. She also reported right flank pain and lower back pain. She reports nausea, no vomiting. She denied urinary symptoms such as frequency, urgency, and dysuria. She has a significant history of GERD,IBS-C, hyperlipidemia, and RICH disease.     On clinic evaluation today, she is in no apparent discomfort, reports pain had improved 2/10.SHE still has intermittent right flank pain, RLQ abdominal pain and discomfort with pelvic pressure. However Denies dysuria or burning with urination. She does not have recurrent UTIs. Urinalysis is completely negative for leukocyte esterase. It is negative for nitrites. It is negative for growth/microscopic hematuria.PVR-0ml    She had a  A CT scan  which revealed a 5.7 mm right proximal ureteral kidney stone, a condition she has never experienced before. She is unsure if she has passed the stone. She experiences frequent nausea but denies any vomiting.     She has been diagnosed with diverticulitis and Crohn's disease. She also suffers from IBS-Constipation. If she does not have a bowel movement for 3 days, she takes medication. STATES  Her gastroenterologist  "advised her to abstain from bowel cleaning for 3 days. She underwent a colonoscopy and does not believe she had polyps this time, although she did have polyps during her previous colonoscopy. She denies passing excessive gas.     Supplemental Information  She had a hysterectomy when she was 21 years old because she started getting forms of cancer.    FINDINGS: KUB 08/08/2024 -2 views of the abdomen SHOWED  Moderate to large stool burden.  Calcification projecting between the right L1 and L2 transverse , processes likely right proximal ureteral stone.     IMPRESSION: Appearance suggestive of right proximal ureteral stone    MPRESSION:CT ABDOMEN/PELVIS 07/16/24   1. Large 5 MM calcification in the proximal right ureter but no abnormal dilatation at the time of the study   2. Other findings as above    Active Ambulatory Problems     Diagnosis Date Noted    Palpitations 02/27/2018    Dizziness 02/27/2018    Syncope 02/27/2018    Acquired hypothyroidism 02/27/2018    Lipoma 02/27/2018    Anxiety 02/27/2018    GERD (gastroesophageal reflux disease) 02/27/2018    PVC's (premature ventricular contractions) 02/27/2018    Mixed hyperlipidemia 02/27/2018    Precordial pain 03/09/2020    Shortness of breath 03/09/2020    Right ureteral stone 08/08/2024    Acute right flank pain 08/08/2024    Right lower quadrant abdominal pain 08/08/2024    Frequency of micturition 08/08/2024     Resolved Ambulatory Problems     Diagnosis Date Noted    No Resolved Ambulatory Problems     Past Medical History:   Diagnosis Date    Hyperlipidemia     Pott's disease     Thyroid disorder       Objective   Vital Signs:   /79 (BP Location: Right arm, Patient Position: Sitting, Cuff Size: Adult)   Pulse 76   Ht 172.7 cm (67.99\")   Wt 89 kg (196 lb 3.2 oz)   BMI 29.84 kg/m²       ROS:   Review of Systems   Constitutional:  Positive for activity change, appetite change, fatigue and unexpected weight gain. Negative for diaphoresis, fever and " unexpected weight loss.   HENT: Negative.  Negative for congestion, ear discharge, ear pain, nosebleeds, rhinorrhea, sinus pressure and sore throat.    Eyes: Negative.  Negative for blurred vision, double vision, photophobia, pain, redness and visual disturbance.   Respiratory: Negative.  Negative for apnea, cough, chest tightness, shortness of breath, wheezing and stridor.    Cardiovascular: Negative.  Negative for chest pain and palpitations.   Gastrointestinal:  Positive for abdominal distention, abdominal pain (RLQ) and constipation. Negative for diarrhea, nausea and vomiting.   Endocrine: Negative.  Negative for polydipsia, polyphagia and polyuria.   Genitourinary:  Positive for flank pain. Negative for decreased urine volume, difficulty urinating, dyspareunia, dysuria, frequency, genital sores, hematuria, pelvic pain, pelvic pressure, urgency, urinary incontinence and vaginal discharge.   Musculoskeletal:  Negative for arthralgias and joint swelling.   Skin:  Positive for dry skin and pallor. Negative for rash and wound.   Allergic/Immunologic: Negative.    Neurological: Negative.  Negative for dizziness, tremors, syncope, weakness and light-headedness.   Hematological: Negative.    Psychiatric/Behavioral:  Positive for sleep disturbance and stress. Negative for behavioral problems and decreased concentration.         Physical Exam  Constitutional:       General: She is in acute distress.      Appearance: She is well-developed. She is obese. She is ill-appearing.   HENT:      Head: Normocephalic and atraumatic.   Eyes:      Pupils: Pupils are equal, round, and reactive to light.   Neck:      Thyroid: No thyromegaly.      Trachea: No tracheal deviation.   Cardiovascular:      Rate and Rhythm: Normal rate and regular rhythm.      Heart sounds: No murmur heard.  Pulmonary:      Effort: Pulmonary effort is normal. No respiratory distress.      Breath sounds: Normal breath sounds. No stridor. No wheezing.    Abdominal:      General: Bowel sounds are normal. There is distension.      Palpations: Abdomen is soft.      Tenderness: Tenderness: RLQ.   Genitourinary:     Labia:         Right: No tenderness.         Left: No tenderness.       Vagina: Normal. No vaginal discharge.   Musculoskeletal:         General: Tenderness present. No deformity. Normal range of motion.      Cervical back: Normal range of motion.   Skin:     General: Skin is warm and dry.      Capillary Refill: Capillary refill takes less than 2 seconds.      Coloration: Skin is not pale.      Findings: No erythema or rash.   Neurological:      Mental Status: She is alert and oriented to person, place, and time.      Cranial Nerves: No cranial nerve deficit.      Sensory: No sensory deficit.      Coordination: Coordination normal.   Psychiatric:         Behavior: Behavior normal.         Thought Content: Thought content normal.         Judgment: Judgment normal.        Physical Exam    Result Review :     UA          8/8/2024    08:56   Urinalysis   Ketones, UA Negative    Leukocytes, UA Negative             Assessment and Plan    Problem List Items Addressed This Visit          Gastrointestinal Abdominal     Acute right flank pain    Relevant Medications    tamsulosin (FLOMAX) 0.4 MG capsule 24 hr capsule    ondansetron (Zofran) 4 MG tablet    ibuprofen (ADVIL,MOTRIN) 800 MG tablet    Other Relevant Orders    XR Abdomen KUB (Completed)    Case Request (Completed)    Right lower quadrant abdominal pain    Relevant Orders    Case Request (Completed)       Genitourinary and Reproductive     Right ureteral stone - Primary    Relevant Medications    tamsulosin (FLOMAX) 0.4 MG capsule 24 hr capsule    ondansetron (Zofran) 4 MG tablet    ibuprofen (ADVIL,MOTRIN) 800 MG tablet    Other Relevant Orders    XR Abdomen KUB (Completed)    Case Request (Completed)    Frequency of micturition    Relevant Orders    POC Urinalysis Dipstick, Automated (Completed)    XR  Abdomen KUB (Completed)       Assessment & Plan                   ASSESSMENT              RIGHT PROXIMAL URETERAL STONE/FLANK/ABDOMINAL PAIN  MS SG CONNELLY IS A a pleasant 43-year-old female who presents to clinic today for evaluation FOR RIGHT PROXIMAL 5.7 MM UPJ STONE . SHE HAD CT ABDOMEN 07/05/24, Secondary to persistent abdominal pain. She had reported pain that is colicky in nature, initially was intermittent but had progressed to sharp right lower quadrant abdominal pain radiating to her pelvic region, causing significant pelvic pressure and suprapubic discomfort. She also reported right flank pain and lower back pain. She reports nausea, no vomiting. She denied urinary symptoms such as frequency, urgency, and dysuria. She has a significant history of GERD,IBS-C, hyperlipidemia, and RICH disease..     SHe presents to clinic today IN NO APPARENT DISCOMFORT. HOWEVER STILL complaints of  INTERMITTENT Severe RLQ ABDOMINA PAIN, INTERMITTENT RIGHT>LEFT Flank pain that is still very colicky in nature,  but become very bothersome to her. KUB  TODAY 08/08/24 CONFIRMED UPJ STONE STILL IN SITU,    We have discussed the various parameters regarding spontaneous passage including the notion that a tiny 1 to 4 mm stone, has a 90% high likelihood of spontaneous passage versus a larger stone>6MM LIKE SHE HAS  being caught up in the upper areas of the urinary tract. We also discussed the medical management of stone disease and the use of medical expulsive therapy in the form of Flomax. This is used in an off label setting.     I also talked about nonoperative management including ambulation and increasing fluids and hot tub as being an effective adjuncts in the treatment of a ureteral stone. We discussed the presence of the stone. We discussed the various therapeutic options available including lithotripsy. We discussed the indicators for intervention including  absolute indicators such as sepsis and uncontrollable severe  pain as well as  the relative indicators of moderate pain that is well-controlled with various analgesia.     ESWL-the patient is a candidate for extracorporeal shockwave lithotripsy.  We discussed the type of stone, And the complications associated with the procedure including but not limited to pain in the flank, hematoma, spontaneous renal hemorrhage, and adequate fragmentation of stones.  The need for passage of the stones.  The need for concomitant additional procedures in the range of 24% the risk of a distal fragment in the range of 3% requiring ureteroscopy.      PLAN.    THE PATIENT IS NOW DESIROUS OF SURGICAL INTERVENTION AT THIS TIME      Patient has been scheduled for RIGHT ESWL - EXTRACORPOREAL SHOCKWAVE LITHOTRIPSY PROCEDURE ON FRIDAY 08/16/24  W. DR SOTELO      Patient HAS Adequate Narcotic pain medication HYDROCODONE 7.5mg to use for severe pain.    SHe has been given nonnarcotic pain medication and advised to continue with NSAIDS for breakthrough pain as needed.  Also nausea medicine, Flomax for medical expulsive therapy.    We discussed  OTHER treatment options for HER BACK/FLANK pain with patient encouraged to continue conservative therapy alternating NSAID/Tylenol as tolerated, Also including hot baths, showers, warm compresses to lower back as tolerated. Also encouraged walking, physical therapy, light exercises as tolerated    Rest is the most important treatment in the case of BACK/FLANK pain. Rest and physical therapy are enough to improve minor pain. Discussed to monitor his daily routine with prevention of flank pain by: At least Drinking 8 glasses of water per day, Limiting your alcohol consumption.  Having a healthy diet containing fruits, vegetables, and a lot of fluids, Practicing safe sex.  Also maintaining proper hygiene of your body as well as the environment.    Discussed a kidney stone prevention diet to include increasing p.o. fluid intake, to at least 1 to 2 L of water daily.   She is to avoid caffeine products such as cola, coffee, and to avoid soft or soda drinks.  She is to decrease her sodium consumption as in  Fast foods, johnson, salted nuts, canned foods, and smoked/cured foods. She is also to decrease her oxalate consumption, as in spinach, Adriano greens, and Rhubarb.  Also important is to decrease protein intake, as in red meats, peanut butter, and also avoid nuts.    FOLLOW UP WITH GI-ABDOMINAL PAIN, IBS-C    FOLLOW UP IN CLINIC POST PROCEDURE,      Patient is Agreeable to plan of care     1. Kidney stones/FLANK/RLQ PAIN-SUMMARY.  She is not having any pain. She will continue conservative therapy. The patient is also on adequate narcotic pain management with hydrocodone 5/325 per PCP. I encouraged alternating NSAIDs, Tylenol, Motrin. I also encouraged taking tamsulosin 0.4 mg daily and nausea medicine as indicated. I advised her to increase her oral fluid intake to at least 1 to 2 liters. If the stone is still in sitter, I will schedule the patient for surgical intervention, extracorporeal shockwave lithotripsy versus right ureteroscopic laser lithotripsy with Dr. Moore.    Follow-up  The patient will follow up in clinic post procedure, may return sooner if need be or go to ER if any worsening pain.  Patient reports that she is not currently experiencing any symptoms of urinary incontinence.      BMI is >= 25 and <30. (Overweight) The following options were offered after discussion;: weight loss educational material (shared in after visit summary), exercise counseling/recommendations, and nutrition counseling/recommendations      RADIOLOGY (CT AND/OR KUB):    CT Abdomen and Pelvis: No results found for this or any previous visit.     CT Stone Protocol: No results found for this or any previous visit.     KUB: No results found for this or any previous visit.       [unfilled]  LABS (3 MONTHS):    Office Visit on 08/08/2024   Component Date Value Ref Range Status    Color  08/08/2024 Yellow  Yellow, Straw, Dark Yellow, Kylee Final    Clarity, UA 08/08/2024 Clear  Clear Final    Specific Gravity  08/08/2024 1.020  1.005 - 1.030 Final    pH, Urine 08/08/2024 5.5  5.0 - 8.0 Final    Leukocytes 08/08/2024 Negative  Negative Final    Nitrite, UA 08/08/2024 Negative  Negative Final    Protein, POC 08/08/2024 Negative  Negative mg/dL Final    Glucose, UA 08/08/2024 Negative  Negative mg/dL Final    Ketones, UA 08/08/2024 Negative  Negative Final    Urobilinogen, UA 08/08/2024 Normal  Normal, 0.2 E.U./dL Final    Bilirubin 08/08/2024 Negative  Negative Final    Blood, UA 08/08/2024 Negative  Negative Final    Lot Number 08/08/2024 [id number]   Final    Expiration Date 08/08/2024 10/25/2024   Final          Follow Up   Return in 8 days (on 8/16/2024) for Next scheduled follow up, W Dr. Merritt IN OR FOR RIGHT ESWL-5.7MM UPJ STONE W. FLANK/RLQ ABD PAIN.    Patient was given instructions and counseling regarding her condition or for health maintenance advice. Please see specific information pulled into the AVS if appropriate.          This document has been electronically signed by Griselda Cheng-Akwa, APRN   August 8, 2024 14:18 EDT      Dictated Utilizing Dragon Dictation: Part of this note may be an electronic transcription/translation of spoken language to printed text using the Dragon Dictation System.      Patient or patient representative verbalized consent for the use of Ambient Listening during the visit with  Griselda Cheng-Akwa, APRN for chart documentation. 8/8/2024  09:33 EDT

## 2024-08-13 NOTE — DISCHARGE INSTRUCTIONS
Please discontinue all blood thinners and anticoagulants (except aspirin) prior to surgery as per your surgeon and cardiologist instructions.  Aspirin may be continued up to the day prior to surgery.    HOLD all diabetic medications the morning of surgery as order by physician.    Please follow instructions on use of prep cloths provided by nurse. Return instruction sheet to pre-op nurse on day of surgery.    Arrival time for surgery on  8/16/24 will be given to you by Dr. Merritt's  Office.    A RESPONSIBLE PERSON MUST REMAIN IN THE WAITING ROOM DURING YOUR PROCEDURE AND A RESPONSIBLE  MUST BE AVAILABLE UPON YOUR DISCHARGE.    General Instructions:  Do NOT eat or drink after midnight 8/15/24 which includes water, mints, or gum.  You may brush your teeth. Dental appliances that are removable must be taken out day of surgery.  Do NOT smoke, chew tobacco, or drink alcohol within 24 hours prior to surgery.  Bring medications in original bottles, any inhalers and if applicable your C-PAP/BI-PAP machine  Bring any papers given to you in the doctor’s office  Wear clean, comfortable clothes and socks  Do NOT wear contact lenses or make-up or dark nail polish.  Bring a case for your glasses if applicable.  Bring crutches or walker if applicable  Leave all other valuables and jewelry at home  If you were given a blood bank armband, continue to wear it until discharged.    Preventing a Surgical Site Infection:  Shower the night before surgery (unless instructed otherwise) using a fresh bar of anti-bacterial soap (such as Dial) and clean washcloth.  Dry with a clean towel and dress in clean clothing.  For 2 to 3 days before surgery, avoid shaving with a razor near where you will have surgery because the razor can irritate skin and make it easier to develop an infection.  Ask your surgeon if you will be receiving antibiotics prior to surgery.  Make sure you, your family, and all healthcare providers clean their hands with  soap and water or an alcohol-based hand  before caring for you or your wound.  If at all possible, quit smoking as many days before surgery as you can.    Day of Surgery:  Upon arrival, a pre-op nurse and anesthesiologist will review your health history, obtain vital signs, and answer questions you may have.  The only belongings needed at this time will be your home medications and if applicable you C-PAP/BI-PAP machine.  If you are staying overnight, your family can leave the rest of your belongings in the car and bring them to your room later.  A pre-op nurse will start an IV and you may receive medication in preparation for surgery.  Due to patient privacy and limited space, only one member of your family will be able to accompany you in the pre-op area.  While you are in surgery your family should notify the waiting room  if they leave the waiting room area and provide a contact number.  Please be aware that surgery does come with discomfort.  We want to make every effort to control your discomfort so please discuss any uncontrolled symptoms with your nurse.  Your doctor will most likely have prescribed pain medications.  If you are going home after surgery you will receive individualized written care instructions before being discharged.  A responsible adult must drive you to and from the hospital on the day of surgery and stay with you for 24 hours.  If you are staying overnight following surgery, you will be transported to your hospital room following the recovery period.

## 2024-08-14 ENCOUNTER — PRE-ADMISSION TESTING (OUTPATIENT)
Dept: PREADMISSION TESTING | Facility: HOSPITAL | Age: 44
End: 2024-08-14
Payer: COMMERCIAL

## 2024-08-14 LAB
ANION GAP SERPL CALCULATED.3IONS-SCNC: 11.8 MMOL/L (ref 5–15)
BUN SERPL-MCNC: 12 MG/DL (ref 6–20)
BUN/CREAT SERPL: 13.8 (ref 7–25)
CALCIUM SPEC-SCNC: 9.4 MG/DL (ref 8.6–10.5)
CHLORIDE SERPL-SCNC: 109 MMOL/L (ref 98–107)
CO2 SERPL-SCNC: 23.2 MMOL/L (ref 22–29)
CREAT SERPL-MCNC: 0.87 MG/DL (ref 0.57–1)
DEPRECATED RDW RBC AUTO: 44.8 FL (ref 37–54)
EGFRCR SERPLBLD CKD-EPI 2021: 84.4 ML/MIN/1.73
ERYTHROCYTE [DISTWIDTH] IN BLOOD BY AUTOMATED COUNT: 13.6 % (ref 12.3–15.4)
GLUCOSE SERPL-MCNC: 90 MG/DL (ref 65–99)
HCT VFR BLD AUTO: 39.6 % (ref 34–46.6)
HGB BLD-MCNC: 12.5 G/DL (ref 12–15.9)
MCH RBC QN AUTO: 28.5 PG (ref 26.6–33)
MCHC RBC AUTO-ENTMCNC: 31.6 G/DL (ref 31.5–35.7)
MCV RBC AUTO: 90.4 FL (ref 79–97)
PLATELET # BLD AUTO: 219 10*3/MM3 (ref 140–450)
PMV BLD AUTO: 11.7 FL (ref 6–12)
POTASSIUM SERPL-SCNC: 3.7 MMOL/L (ref 3.5–5.2)
RBC # BLD AUTO: 4.38 10*6/MM3 (ref 3.77–5.28)
SODIUM SERPL-SCNC: 144 MMOL/L (ref 136–145)
WBC NRBC COR # BLD AUTO: 7.44 10*3/MM3 (ref 3.4–10.8)

## 2024-08-14 PROCEDURE — 85027 COMPLETE CBC AUTOMATED: CPT

## 2024-08-14 PROCEDURE — 80048 BASIC METABOLIC PNL TOTAL CA: CPT

## 2024-08-14 PROCEDURE — 36415 COLL VENOUS BLD VENIPUNCTURE: CPT

## 2024-08-16 ENCOUNTER — ANESTHESIA EVENT (OUTPATIENT)
Dept: PERIOP | Facility: HOSPITAL | Age: 44
End: 2024-08-16
Payer: COMMERCIAL

## 2024-08-16 ENCOUNTER — ANESTHESIA (OUTPATIENT)
Dept: PERIOP | Facility: HOSPITAL | Age: 44
End: 2024-08-16
Payer: COMMERCIAL

## 2024-08-16 ENCOUNTER — APPOINTMENT (OUTPATIENT)
Dept: CT IMAGING | Facility: HOSPITAL | Age: 44
End: 2024-08-16
Payer: COMMERCIAL

## 2024-08-16 ENCOUNTER — HOSPITAL ENCOUNTER (OUTPATIENT)
Facility: HOSPITAL | Age: 44
Setting detail: HOSPITAL OUTPATIENT SURGERY
Discharge: HOME | End: 2024-08-16
Attending: UROLOGY | Admitting: UROLOGY
Payer: COMMERCIAL

## 2024-08-16 ENCOUNTER — APPOINTMENT (OUTPATIENT)
Dept: GENERAL RADIOLOGY | Facility: HOSPITAL | Age: 44
End: 2024-08-16
Payer: COMMERCIAL

## 2024-08-16 VITALS
RESPIRATION RATE: 18 BRPM | HEIGHT: 68 IN | SYSTOLIC BLOOD PRESSURE: 118 MMHG | DIASTOLIC BLOOD PRESSURE: 81 MMHG | HEART RATE: 68 BPM | TEMPERATURE: 98.3 F | WEIGHT: 198 LBS | BODY MASS INDEX: 30.01 KG/M2 | OXYGEN SATURATION: 99 %

## 2024-08-16 DIAGNOSIS — R10.9 ACUTE RIGHT FLANK PAIN: ICD-10-CM

## 2024-08-16 DIAGNOSIS — R10.31 RIGHT LOWER QUADRANT ABDOMINAL PAIN: ICD-10-CM

## 2024-08-16 DIAGNOSIS — N20.1 RIGHT URETERAL STONE: ICD-10-CM

## 2024-08-16 PROCEDURE — 25010000002 FENTANYL CITRATE (PF) 50 MCG/ML SOLUTION: Performed by: NURSE ANESTHETIST, CERTIFIED REGISTERED

## 2024-08-16 PROCEDURE — 74018 RADEX ABDOMEN 1 VIEW: CPT | Performed by: RADIOLOGY

## 2024-08-16 PROCEDURE — 25810000003 LACTATED RINGERS PER 1000 ML: Performed by: ANESTHESIOLOGY

## 2024-08-16 PROCEDURE — 25010000002 GENTAMICIN PER 80 MG: Performed by: UROLOGY

## 2024-08-16 PROCEDURE — 25010000002 MIDAZOLAM PER 1 MG: Performed by: NURSE ANESTHETIST, CERTIFIED REGISTERED

## 2024-08-16 PROCEDURE — 25010000002 PROPOFOL 200 MG/20ML EMULSION: Performed by: NURSE ANESTHETIST, CERTIFIED REGISTERED

## 2024-08-16 PROCEDURE — 25010000002 ONDANSETRON PER 1 MG: Performed by: NURSE ANESTHETIST, CERTIFIED REGISTERED

## 2024-08-16 PROCEDURE — 74176 CT ABD & PELVIS W/O CONTRAST: CPT

## 2024-08-16 PROCEDURE — 25010000002 KETOROLAC TROMETHAMINE PER 15 MG: Performed by: NURSE ANESTHETIST, CERTIFIED REGISTERED

## 2024-08-16 PROCEDURE — 50590 FRAGMENTING OF KIDNEY STONE: CPT | Performed by: UROLOGY

## 2024-08-16 PROCEDURE — 74018 RADEX ABDOMEN 1 VIEW: CPT

## 2024-08-16 PROCEDURE — 25810000003 LACTATED RINGERS PER 1000 ML: Performed by: NURSE ANESTHETIST, CERTIFIED REGISTERED

## 2024-08-16 PROCEDURE — 74176 CT ABD & PELVIS W/O CONTRAST: CPT | Performed by: RADIOLOGY

## 2024-08-16 RX ORDER — IPRATROPIUM BROMIDE AND ALBUTEROL SULFATE 2.5; .5 MG/3ML; MG/3ML
3 SOLUTION RESPIRATORY (INHALATION) ONCE AS NEEDED
Status: DISCONTINUED | OUTPATIENT
Start: 2024-08-16 | End: 2024-08-16 | Stop reason: HOSPADM

## 2024-08-16 RX ORDER — MIDAZOLAM HYDROCHLORIDE 1 MG/ML
1 INJECTION INTRAMUSCULAR; INTRAVENOUS
Status: DISCONTINUED | OUTPATIENT
Start: 2024-08-16 | End: 2024-08-16 | Stop reason: HOSPADM

## 2024-08-16 RX ORDER — SODIUM CHLORIDE, SODIUM LACTATE, POTASSIUM CHLORIDE, CALCIUM CHLORIDE 600; 310; 30; 20 MG/100ML; MG/100ML; MG/100ML; MG/100ML
100 INJECTION, SOLUTION INTRAVENOUS ONCE AS NEEDED
Status: DISCONTINUED | OUTPATIENT
Start: 2024-08-16 | End: 2024-08-16 | Stop reason: HOSPADM

## 2024-08-16 RX ORDER — PROPOFOL 10 MG/ML
INJECTION, EMULSION INTRAVENOUS AS NEEDED
Status: DISCONTINUED | OUTPATIENT
Start: 2024-08-16 | End: 2024-08-16 | Stop reason: SURG

## 2024-08-16 RX ORDER — FAMOTIDINE 10 MG/ML
INJECTION, SOLUTION INTRAVENOUS AS NEEDED
Status: DISCONTINUED | OUTPATIENT
Start: 2024-08-16 | End: 2024-08-16 | Stop reason: SURG

## 2024-08-16 RX ORDER — SODIUM CHLORIDE 9 MG/ML
40 INJECTION, SOLUTION INTRAVENOUS AS NEEDED
Status: DISCONTINUED | OUTPATIENT
Start: 2024-08-16 | End: 2024-08-16 | Stop reason: HOSPADM

## 2024-08-16 RX ORDER — GENTAMICIN SULFATE 80 MG/100ML
80 INJECTION, SOLUTION INTRAVENOUS ONCE
Status: COMPLETED | OUTPATIENT
Start: 2024-08-16 | End: 2024-08-16

## 2024-08-16 RX ORDER — MIDAZOLAM HYDROCHLORIDE 1 MG/ML
INJECTION INTRAMUSCULAR; INTRAVENOUS AS NEEDED
Status: DISCONTINUED | OUTPATIENT
Start: 2024-08-16 | End: 2024-08-16 | Stop reason: SURG

## 2024-08-16 RX ORDER — HYDROCODONE BITARTRATE AND ACETAMINOPHEN 10; 325 MG/1; MG/1
1 TABLET ORAL EVERY 6 HOURS PRN
Qty: 16 TABLET | Refills: 0 | Status: SHIPPED | OUTPATIENT
Start: 2024-08-16

## 2024-08-16 RX ORDER — KETOROLAC TROMETHAMINE 30 MG/ML
30 INJECTION, SOLUTION INTRAMUSCULAR; INTRAVENOUS EVERY 6 HOURS PRN
Status: COMPLETED | OUTPATIENT
Start: 2024-08-16 | End: 2024-08-16

## 2024-08-16 RX ORDER — OXYCODONE AND ACETAMINOPHEN 5; 325 MG/1; MG/1
1 TABLET ORAL ONCE AS NEEDED
Status: DISCONTINUED | OUTPATIENT
Start: 2024-08-16 | End: 2024-08-16 | Stop reason: HOSPADM

## 2024-08-16 RX ORDER — FENTANYL CITRATE 50 UG/ML
INJECTION, SOLUTION INTRAMUSCULAR; INTRAVENOUS AS NEEDED
Status: DISCONTINUED | OUTPATIENT
Start: 2024-08-16 | End: 2024-08-16 | Stop reason: SURG

## 2024-08-16 RX ORDER — ONDANSETRON 2 MG/ML
4 INJECTION INTRAMUSCULAR; INTRAVENOUS AS NEEDED
Status: DISCONTINUED | OUTPATIENT
Start: 2024-08-16 | End: 2024-08-16 | Stop reason: HOSPADM

## 2024-08-16 RX ORDER — LIDOCAINE HYDROCHLORIDE 20 MG/ML
INJECTION, SOLUTION EPIDURAL; INFILTRATION; INTRACAUDAL; PERINEURAL AS NEEDED
Status: DISCONTINUED | OUTPATIENT
Start: 2024-08-16 | End: 2024-08-16 | Stop reason: SURG

## 2024-08-16 RX ORDER — SODIUM CHLORIDE 0.9 % (FLUSH) 0.9 %
10 SYRINGE (ML) INJECTION EVERY 12 HOURS SCHEDULED
Status: DISCONTINUED | OUTPATIENT
Start: 2024-08-16 | End: 2024-08-16 | Stop reason: HOSPADM

## 2024-08-16 RX ORDER — EPHEDRINE SULFATE 5 MG/ML
INJECTION INTRAVENOUS AS NEEDED
Status: DISCONTINUED | OUTPATIENT
Start: 2024-08-16 | End: 2024-08-16 | Stop reason: SURG

## 2024-08-16 RX ORDER — MEPERIDINE HYDROCHLORIDE 25 MG/ML
12.5 INJECTION INTRAMUSCULAR; INTRAVENOUS; SUBCUTANEOUS
Status: DISCONTINUED | OUTPATIENT
Start: 2024-08-16 | End: 2024-08-16 | Stop reason: HOSPADM

## 2024-08-16 RX ORDER — SODIUM CHLORIDE 0.9 % (FLUSH) 0.9 %
10 SYRINGE (ML) INJECTION AS NEEDED
Status: DISCONTINUED | OUTPATIENT
Start: 2024-08-16 | End: 2024-08-16 | Stop reason: HOSPADM

## 2024-08-16 RX ORDER — ONDANSETRON 2 MG/ML
INJECTION INTRAMUSCULAR; INTRAVENOUS AS NEEDED
Status: DISCONTINUED | OUTPATIENT
Start: 2024-08-16 | End: 2024-08-16 | Stop reason: SURG

## 2024-08-16 RX ORDER — SODIUM CHLORIDE, SODIUM LACTATE, POTASSIUM CHLORIDE, CALCIUM CHLORIDE 600; 310; 30; 20 MG/100ML; MG/100ML; MG/100ML; MG/100ML
INJECTION, SOLUTION INTRAVENOUS CONTINUOUS PRN
Status: DISCONTINUED | OUTPATIENT
Start: 2024-08-16 | End: 2024-08-16 | Stop reason: SURG

## 2024-08-16 RX ORDER — SODIUM CHLORIDE, SODIUM LACTATE, POTASSIUM CHLORIDE, CALCIUM CHLORIDE 600; 310; 30; 20 MG/100ML; MG/100ML; MG/100ML; MG/100ML
125 INJECTION, SOLUTION INTRAVENOUS ONCE
Status: COMPLETED | OUTPATIENT
Start: 2024-08-16 | End: 2024-08-16

## 2024-08-16 RX ORDER — FENTANYL CITRATE 50 UG/ML
50 INJECTION, SOLUTION INTRAMUSCULAR; INTRAVENOUS
Status: DISCONTINUED | OUTPATIENT
Start: 2024-08-16 | End: 2024-08-16 | Stop reason: HOSPADM

## 2024-08-16 RX ADMIN — GENTAMICIN SULFATE 80 MG: 80 INJECTION, SOLUTION INTRAVENOUS at 11:27

## 2024-08-16 RX ADMIN — FAMOTIDINE 20 MG: 10 INJECTION, SOLUTION INTRAVENOUS at 11:27

## 2024-08-16 RX ADMIN — KETOROLAC TROMETHAMINE 30 MG: 30 INJECTION, SOLUTION INTRAMUSCULAR; INTRAVENOUS at 12:38

## 2024-08-16 RX ADMIN — LIDOCAINE HYDROCHLORIDE 4 ML: 20 INJECTION, SOLUTION EPIDURAL; INFILTRATION; INTRACAUDAL; PERINEURAL at 11:40

## 2024-08-16 RX ADMIN — ONDANSETRON 4 MG: 2 INJECTION INTRAMUSCULAR; INTRAVENOUS at 11:27

## 2024-08-16 RX ADMIN — MIDAZOLAM HYDROCHLORIDE 2 MG: 1 INJECTION, SOLUTION INTRAMUSCULAR; INTRAVENOUS at 11:27

## 2024-08-16 RX ADMIN — FENTANYL CITRATE 100 MCG: 50 INJECTION INTRAMUSCULAR; INTRAVENOUS at 11:27

## 2024-08-16 RX ADMIN — SODIUM CHLORIDE, POTASSIUM CHLORIDE, SODIUM LACTATE AND CALCIUM CHLORIDE 125 ML/HR: 600; 310; 30; 20 INJECTION, SOLUTION INTRAVENOUS at 10:50

## 2024-08-16 RX ADMIN — PROPOFOL 150 MG: 10 INJECTION, EMULSION INTRAVENOUS at 11:30

## 2024-08-16 RX ADMIN — EPHEDRINE SULFATE 10 MG: 5 INJECTION INTRAVENOUS at 11:37

## 2024-08-16 RX ADMIN — SODIUM CHLORIDE, POTASSIUM CHLORIDE, SODIUM LACTATE AND CALCIUM CHLORIDE: 600; 310; 30; 20 INJECTION, SOLUTION INTRAVENOUS at 10:51

## 2024-08-16 NOTE — ANESTHESIA POSTPROCEDURE EVALUATION
Patient: Vidhya Park    Procedure Summary       Date: 08/16/24 Room / Location: [facility] OR 09 /  [facility] OR    Anesthesia Start: 1127 Anesthesia Stop: 1202    Procedure: EXTRACORPOREAL SHOCKWAVE LITHOTRIPSY (Right) Diagnosis:       Right ureteral stone      Acute right flank pain      Right lower quadrant abdominal pain      (Right ureteral stone [N20.1])      (Acute right flank pain [R10.9])      (Right lower quadrant abdominal pain [R10.31])    Surgeons: Tom Merritt MD Provider: Franky Hernandez DO    Anesthesia Type: general ASA Status: 2            Anesthesia Type: general    Vitals  Vitals Value Taken Time   /77 08/16/24 1225   Temp 98.9 °F (37.2 °C) 08/16/24 1203   Pulse 69 08/16/24 1226   Resp 16 08/16/24 1223   SpO2 100 % 08/16/24 1226   Vitals shown include unfiled device data.        Post Anesthesia Care and Evaluation    Patient location during evaluation: PHASE II  Patient participation: complete - patient participated  Level of consciousness: awake and alert  Pain score: 1  Pain management: adequate    Airway patency: patent  Anesthetic complications: No anesthetic complications  PONV Status: controlled  Cardiovascular status: acceptable  Respiratory status: acceptable  Hydration status: acceptable

## 2024-08-16 NOTE — ANESTHESIA PREPROCEDURE EVALUATION
Anesthesia Evaluation     Patient summary reviewed and Nursing notes reviewed   no history of anesthetic complications:   NPO Solid Status: > 8 hours  NPO Liquid Status: > 8 hours           Airway   Mallampati: II  TM distance: >3 FB  Neck ROM: full  No difficulty expected  Dental    (+) poor dentition    Pulmonary - normal exam    breath sounds clear to auscultation  (+) ,shortness of breath, sleep apnea  Cardiovascular - normal exam  Exercise tolerance: good (4-7 METS)    NYHA Classification: II    (+) hyperlipidemia      Neuro/Psych  (+) dizziness/light headedness, syncope, psychiatric history  GI/Hepatic/Renal/Endo    (+) GERD, thyroid problem hypothyroidism    Musculoskeletal (-) negative ROS    Abdominal  - normal exam   Substance History - negative use     OB/GYN negative ob/gyn ROS         Other - negative ROS                   Anesthesia Plan    ASA 2     general     intravenous induction     Anesthetic plan, risks, benefits, and alternatives have been provided, discussed and informed consent has been obtained with: patient.  Pre-procedure education provided  Plan discussed with CRNA.    CODE STATUS:

## 2024-08-16 NOTE — ANESTHESIA PROCEDURE NOTES
Airway  Urgency: elective    Date/Time: 8/16/2024 11:31 AM  End Time:8/16/2024 11:31 AM  Airway not difficult    General Information and Staff    Patient location during procedure: OR  CRNA/CAA: Edison Shaw CRNA    Indications and Patient Condition  Indications for airway management: airway protection    Preoxygenated: yes  MILS maintained throughout  Mask difficulty assessment: 1 - vent by mask    Final Airway Details  Final airway type: supraglottic airway      Successful airway: classic  Size 4     Number of attempts at approach: 1    Additional Comments  AOI X 1 PASS +BBS, +ETCO2, +R//F/C MOUTH TEETH GUMS SAME AS PREOP

## 2024-08-16 NOTE — OP NOTE
EXTRACORPOREAL SHOCKWAVE LITHOTRIPSY  Procedure Note    Vidhya Park  8/16/2024    Pre-op Diagnosis:   Right ureteral stone [N20.1]  Acute right flank pain [R10.9]  Right lower quadrant abdominal pain [R10.31]    Post-op Diagnosis:     Post-Op Diagnosis Codes:     * Right ureteral stone [N20.1]     * Acute right flank pain [R10.9]     * Right lower quadrant abdominal pain [R10.31]    Procedure/CPT® Codes:    43-year-old white female with a right lower pole painful stone that is going in and out of the [facility]-the patient is a candidate for extracorporeal shockwave lithotripsy.  We discussed the type of stone and the complications associated with the procedure including, but not limited to, pain in the flank, hematoma, spontaneous renal hemorrhage, inadequate fragmentation of stones, the need for passage of the stones, the need for concomitant additional procedures in the range of 24%, the risk of a distal fragment in the range of 3% requiring ureteroscopic removal, and the fact that sometimes a stent is indicated based on the size and the density of the stone as determined on the CAT scan.  Additionally, we discussed percutaneous nephrostolithotomy.  Including the mini PERC.  With its attendant risks of anesthesia bleeding infection and the fact that is a invasive procedure with the remote possibility of a nephrectomy.  We also discussed the use of ureteroscopy which is a rigid or flexible instrument placed up into the kidney to break up stones with the laser beam and very likely a postop stent and a high likelihood of additional concomitant procedures.  Following an informed consent, he was brought to the operative suite and underwent induction of general endotracheal anesthetic.  The stone was localized at F2 and a total of 3000 shockwaves was administered without complication.  There was excellent fragmentation  He was awake and alert and returned to recovery room.       Procedure(s):  EXTRACORPOREAL  SHOCKWAVE LITHOTRIPSY    Surgeon(s):  Tom Merritt MD    Anesthesia: see anesthesia record    Staff:   Circulator: Precious Smith RN; Mirna Cook RN  Scrub Person: Maegan Dougherty LPN; Court Weaver    Estimated Blood Loss: none  Urine Voided: * No values recorded between 8/16/2024 11:27 AM and 8/16/2024 12:02 PM *    Specimens:                None      Drains: None    Findings: Excellent fragmentation    Blood: N/A    Complications: None    Grafts and Implants: None    Tom Merritt MD     Date: 8/16/2024  Time: 12:06 EDT

## 2024-08-30 ENCOUNTER — OFFICE VISIT (OUTPATIENT)
Dept: UROLOGY | Facility: CLINIC | Age: 44
End: 2024-08-30
Payer: COMMERCIAL

## 2024-08-30 ENCOUNTER — HOSPITAL ENCOUNTER (OUTPATIENT)
Dept: GENERAL RADIOLOGY | Facility: HOSPITAL | Age: 44
Discharge: HOME | End: 2024-08-30
Admitting: UROLOGY
Payer: COMMERCIAL

## 2024-08-30 VITALS
BODY MASS INDEX: 30.62 KG/M2 | DIASTOLIC BLOOD PRESSURE: 71 MMHG | HEIGHT: 68 IN | HEART RATE: 82 BPM | SYSTOLIC BLOOD PRESSURE: 106 MMHG | WEIGHT: 202 LBS

## 2024-08-30 DIAGNOSIS — R10.9 ACUTE RIGHT FLANK PAIN: ICD-10-CM

## 2024-08-30 DIAGNOSIS — N20.1 RIGHT URETERAL STONE: ICD-10-CM

## 2024-08-30 DIAGNOSIS — K59.04 CHRONIC IDIOPATHIC CONSTIPATION: ICD-10-CM

## 2024-08-30 DIAGNOSIS — N20.1 RIGHT URETERAL STONE: Primary | ICD-10-CM

## 2024-08-30 DIAGNOSIS — R30.0 DYSURIA: ICD-10-CM

## 2024-08-30 LAB
BILIRUB BLD-MCNC: NEGATIVE MG/DL
CLARITY, POC: CLEAR
COLOR UR: YELLOW
EXPIRATION DATE: ABNORMAL
GLUCOSE UR STRIP-MCNC: NEGATIVE MG/DL
KETONES UR QL: NEGATIVE
LEUKOCYTE EST, POC: ABNORMAL
Lab: ABNORMAL
NITRITE UR-MCNC: NEGATIVE MG/ML
PH UR: 6 [PH] (ref 5–8)
PROT UR STRIP-MCNC: NEGATIVE MG/DL
RBC # UR STRIP: NEGATIVE /UL
SP GR UR: 1.02 (ref 1–1.03)
UROBILINOGEN UR QL: NORMAL

## 2024-08-30 PROCEDURE — 74018 RADEX ABDOMEN 1 VIEW: CPT

## 2024-08-30 PROCEDURE — 87086 URINE CULTURE/COLONY COUNT: CPT | Performed by: NURSE PRACTITIONER

## 2024-08-30 NOTE — PROGRESS NOTES
Chief Complaint  RIGHT PROXIMAL URETERAL STONE/FLANK/ABDOMINAL PAIN (2 WEEKS FOLLOW UP POST ESWL)    Subjective          Vidhya Park presents to [facility] for RIGHT UPJ  STONE-POST ESWL STONE RESOLVED  History of Present Illness   History of Present Illness  The patient is a 43-year-old female with a significant history of recurrent kidney stones, who returns to the clinic today for evaluation of kidney stones. She underwent an extracorporeal shockwave lithotripsy (ESWL) procedure on 08/16/2024, performed by Dr. Phillips, due to a painful 5.7 mm right ureteral stone that was moving in and out of the renal pelvis causing significant right  flank pain radiating to right lower back, right lower quadrant abdomen causing significant pelvic pressure and suprapubic discomfort.  She did tolerate procedure well and denies any discomfort today.     She has not experienced any postoperative complications such as fevers, nausea, vomiting, severe flank pain, hematoma, or spontaneous renal hemorrhage. She also reports no recent distal fragment. She has passed adequate fragmentation of stones and felt better until recently.     This morning, she reports persistent lower quadrant abdominal pain. However, a repeat KUB today shows no stones or evidence of obstruction. No radiopaque stones are seen overlying the renal shadows. She has completely passed the stone.  However shows moderate to large volume stool consistent with chronic idiopathic constipation.  Urinalysis showed trace leukocyte esterase, it is negative for nitrite, and it is negative for gross/microscopic hematuria. PVR is 0 mL.    OVERALL, She reports experiencing some pain in the lower quadrant of her abdomen since her surgery. She does not have any nausea or vomiting but admits to feeling unwell. Her last bowel movement was yesterday. She has been taking stool softeners, including MiraLAX and magnesium citrate.    She  does not require any medication refills at this time. She maintains hydration by drinking 6 to 7 bottles of water daily. Her diet is high in sodium, which she needs to manage her POTS. If she does not consume enough salt, her blood pressure drops significantly. She keeps salt packets in her car and consumes 2 to 3 packets daily to prevent fainting. She also drinks a case of Propel every other day. Her pituitary gland hormone levels have been checked and found to be normal.    She is on 2 different muscle relaxers for her pulled shoulder muscle. She has been using a heating pad, hot showers, and muscle rubs.    IMPRESSION:KUB 08/30/24 POST ESWL- No radiopaque renal stones identified.    FINDINGS: KUB 08/08/2024 -2 views of the abdomen SHOWED  Moderate to large stool burden.  Calcification projecting between the right L1 and L2 transverse , processes likely right proximal ureteral stone.    IMPRESSION: Appearance suggestive of right proximal ureteral stone     MPRESSION:CT ABDOMEN/PELVIS 07/16/24   1. Large 5 MM calcification in the proximal right ureter but no abnormal dilatation at the time of the study   2. Other findings as above    Active Ambulatory Problems     Diagnosis Date Noted    Palpitations 02/27/2018    Dizziness 02/27/2018    Syncope 02/27/2018    Acquired hypothyroidism 02/27/2018    Lipoma 02/27/2018    Anxiety 02/27/2018    GERD (gastroesophageal reflux disease) 02/27/2018    PVC's (premature ventricular contractions) 02/27/2018    Mixed hyperlipidemia 02/27/2018    Precordial pain 03/09/2020    Shortness of breath 03/09/2020    Right ureteral stone 08/08/2024    Acute right flank pain 08/08/2024    Right lower quadrant abdominal pain 08/08/2024    Frequency of micturition 08/08/2024    Dysuria 08/30/2024     Resolved Ambulatory Problems     Diagnosis Date Noted    No Resolved Ambulatory Problems     Past Medical History:   Diagnosis Date    Arthritis     CPAP (continuous positive airway pressure)  "dependence     Elevated cholesterol     Hyperlipidemia     Pott's disease     Sleep apnea     Thyroid disorder       Objective   Vital Signs:   /71 (BP Location: Left arm, Patient Position: Sitting)   Pulse 82   Ht 172.7 cm (67.99\")   Wt 91.6 kg (202 lb)   BMI 30.72 kg/m²       ROS:   Review of Systems   Constitutional:  Positive for activity change, appetite change, fatigue and unexpected weight gain. Negative for chills, diaphoresis, fever and unexpected weight loss.   HENT: Negative.  Negative for congestion, ear discharge, ear pain, nosebleeds, rhinorrhea, sinus pressure and sore throat.    Eyes: Negative.  Negative for blurred vision, double vision, photophobia, pain, redness and visual disturbance.   Respiratory:  Negative for apnea, cough, chest tightness, shortness of breath, wheezing and stridor.    Cardiovascular:  Negative for chest pain, palpitations and leg swelling.   Gastrointestinal:  Positive for abdominal distention, abdominal pain, constipation and nausea. Negative for diarrhea and vomiting.   Endocrine: Negative.  Negative for polydipsia, polyphagia and polyuria.   Genitourinary:  Positive for decreased urine volume, difficulty urinating, dysuria, flank pain, frequency, pelvic pain and pelvic pressure. Negative for dyspareunia, genital sores, hematuria, urgency, urinary incontinence and vaginal discharge.   Musculoskeletal:  Positive for myalgias. Negative for arthralgias, back pain and joint swelling.   Skin:  Positive for color change, dry skin and pallor. Negative for rash and wound.   Allergic/Immunologic: Negative.    Neurological:  Negative for dizziness, tremors, syncope, weakness, light-headedness, headache, memory problem and confusion.   Hematological: Negative.    Psychiatric/Behavioral:  Positive for sleep disturbance and stress. Negative for behavioral problems and decreased concentration.         Physical Exam  Constitutional:       General: She is in acute distress.      " Appearance: She is well-developed. She is obese. She is ill-appearing.   HENT:      Head: Normocephalic and atraumatic.   Eyes:      Pupils: Pupils are equal, round, and reactive to light.   Neck:      Thyroid: No thyromegaly.      Trachea: No tracheal deviation.   Cardiovascular:      Rate and Rhythm: Normal rate and regular rhythm.      Heart sounds: No murmur heard.  Pulmonary:      Effort: Pulmonary effort is normal. No respiratory distress.      Breath sounds: Normal breath sounds. No stridor. No wheezing.   Abdominal:      General: Bowel sounds are normal. There is distension.      Palpations: Abdomen is soft.      Tenderness: There is abdominal tenderness. There is guarding.   Genitourinary:     Labia:         Right: No tenderness.         Left: No tenderness.       Vagina: Normal. No vaginal discharge.   Musculoskeletal:         General: Tenderness present. No deformity. Normal range of motion.      Cervical back: Normal range of motion.   Skin:     General: Skin is warm and dry.      Capillary Refill: Capillary refill takes less than 2 seconds.      Coloration: Skin is not pale.      Findings: No erythema or rash.   Neurological:      Mental Status: She is alert and oriented to person, place, and time.      Cranial Nerves: No cranial nerve deficit.      Sensory: No sensory deficit.      Motor: Weakness present.      Coordination: Coordination normal.   Psychiatric:         Behavior: Behavior normal.         Thought Content: Thought content normal.         Judgment: Judgment normal.        Physical Exam    Result Review :     UA          8/8/2024    08:56 8/30/2024    09:55   Urinalysis   Ketones, UA Negative  Negative    Leukocytes, UA Negative  Trace      Urine Culture          8/30/2024    09:55   Urine Culture   Urine Culture No growth             Assessment and Plan    Problem List Items Addressed This Visit          Gastrointestinal Abdominal     Acute right flank pain       Genitourinary and  Reproductive     Right ureteral stone - Primary    Dysuria    Relevant Orders    POC Urinalysis Dipstick, Automated (Completed)    Urine Culture - Urine, Urine, Clean Catch (Completed)     Other Visit Diagnoses       Chronic idiopathic constipation        Relevant Medications    linaclotide (Linzess) 145 MCG capsule capsule            Assessment & Plan                                ASSESSMENT  RIGHT PROXIMAL URETERAL STONE-RESOLVED /FLANK/ABDOMINAL PAIN/IBS-C  MS SG CONNELLY IS A a pleasant 43-year-old female who presents to clinic today for evaluation FOR RIGHT PROXIMAL 5.7 MM UPJ STONE .   She is post recent right ESWL 08/16/2024 by Dr. Merritt with complete resolution of her prior kidney stone.  Nevertheless on clinic evaluation today she reports right lower quadrant abdominal pain that has been persistent postsurgery.  She denies any urinary symptoms of frequency urgency or dysuria, denies any burning with urination.  Her urinalysis is completely negative.    SHE HAD CT ABDOMEN 07/05/24, Secondary to persistent abdominal pain. She had reported pain that is colicky in nature, initially was intermittent but had progressed to sharp right lower quadrant abdominal pain radiating to her pelvic region, causing significant pelvic pressure and suprapubic discomfort. She also reported right flank pain and lower back pain. She reports nausea, no vomiting. She denied urinary symptoms such as frequency, urgency, and dysuria. She has a significant history of GERD,IBS-C, hyperlipidemia, and RICH disease..      SHe presents to clinic today IN NO APPARENT DISCOMFORT. HOWEVER STILL complaints of  INTERMITTENT Severe RLQ ABDOMINA PAIN, INTERMITTENT RIGHT>LEFT Flank pain that is still very colicky in nature,  but become very bothersome to her. KUB  TODAY 08/30/24 CONFIRMED UPJ STONE RESOLVED     However, again we have discussed the various parameters regarding spontaneous passage including the notion that a tiny 1 to 4 mm  stone, has a 90% high likelihood of spontaneous passage versus a larger stone>6MM LIKE SHE HAS  being caught up in the upper areas of the urinary tract. We also discussed the medical management of stone disease and the use of medical expulsive therapy in the form of Flomax. This is used in an off label setting.      I also talked about nonoperative management including ambulation and increasing fluids and hot tub as being an effective adjuncts in the treatment of a ureteral stone. We discussed the presence of the stone. We discussed the various therapeutic options available including lithotripsy. We discussed the indicators for intervention including  absolute indicators such as sepsis and uncontrollable severe pain as well as  the relative indicators of moderate pain that is well-controlled with various analgesia.      ESWL-the patient is a candidate for extracorporeal shockwave lithotripsy.  We discussed the type of stone, And the complications associated with the procedure including but not limited to pain in the flank, hematoma, spontaneous renal hemorrhage, and adequate fragmentation of stones.  The need for passage of the stones.  The need for concomitant additional procedures in the range of 24% the risk of a distal fragment in the range of 3% requiring ureteroscopy.    IBS- Patient has significant irritable bowel syndrome, characterized by extreme amounts of constipation.  We spoke about the impact of this on bladder function.  We spoke about  its relationship to recurrent urinary tract infections.  We discussed the need for increasing p.o. fluid intake to at least 2 to 3 L of water daily, and discussed the physiology of colonic motility as well as use of MiraLAX as a bulk laxative versus the newer class of serotonin uptake blockers such as Linzess.  We stressed the need for a daily bowel movement and discussed the Hudson stool scale at length.We also discussed a referral to the GI nurse practitioner                               PLAN.      Will continue conservative therapy at this time kidney stone resolved.     Patient HAS Adequate Narcotic pain medication HYDROCODONE 7.5mg to use for severe pain.     SHe has been given nonnarcotic pain medication and advised to continue with NSAIDS for breakthrough pain as needed.  Also nausea medicine, Flomax for medical expulsive therapy.     We discussed  OTHER treatment options for HER BACK/FLANK and shoulder pain with patient encouraged to continue conservative therapy alternating NSAID/Tylenol as tolerated, Also including hot baths, showers, warm compresses to lower back as tolerated. Also encouraged walking, physical therapy, light exercises as tolerated     Rest is the most important treatment in the case of BACK/FLANK pain. Rest and physical therapy are enough to improve minor pain. Discussed to monitor his daily routine with prevention of flank pain by: At least Drinking 8 glasses of water per day, Limiting your alcohol consumption.  Having a healthy diet containing fruits, vegetables, and a lot of fluids, Practicing safe sex.  Also maintaining proper hygiene of your body as well as the environment.     Discussed a kidney stone prevention diet to include increasing p.o. fluid intake, to at least 1 to 2 L of water daily.  She is to avoid caffeine products such as cola, coffee, and to avoid soft or soda drinks.  She is to decrease her sodium consumption as in  Fast foods, johnson, salted nuts, canned foods, and smoked/cured foods. She is also to decrease her oxalate consumption, as in spinach, Adriano greens, and Rhubarb.  Also important is to decrease protein intake, as in red meats, peanut butter, and also avoid nuts.    Samples of Linzess 145 mg given for IBS-see     FOLLOW UP WITH GI-ABDOMINAL PAIN, IBS-C     FOLLOW UP IN CLINIC POST PROCEDURE,       Patient is Agreeable to plan of care       1. Kidney Stone-summary.  The kidney stone issue has been resolved. She had  extracorporeal shockwave lithotripsy (ESWL) on 08/16/2024 for a 5.7 mm right ureteral stone. Postoperative recovery has been uneventful with no complications such as fever, nausea, vomiting, severe pain, hematoma, or renal hemorrhage. A repeat KUB today is negative for any stones, and urinalysis showed trace leukocyte esterase but was negative for nitrite and hematuria. She is advised to maintain adequate hydration to prevent future stones.     2. Chronic Idiopathic Constipation.  She reports persistent lower quadrant abdominal pain and moderate to large volume stool consistent with chronic idiopathic constipation. She has been using stool softeners and MiraLAX. Samples of Linzess were provided for her to try, and she is advised to take magnesium citrate as needed. She should take Linzess on a day when she will be home.     3. Muscle Pain.  She reports muscle pain in her shoulder, likely due to carrying her grandchild. She is advised to use a heating pad, take hot showers, and apply muscle rubs. A muscle relaxer, Flexeril, will be prescribed.    4. Postural Orthostatic Tachycardia Syndrome (POTS).  She requires a high sodium intake to manage her POTS and prevent episodes of low blood pressure. She is advised to balance her sodium intake with adequate fluid intake to prevent kidney stones. She should aim for no more than 2000 mg of sodium per day and continue drinking six to seven bottles of water daily.    Patient reports that she is not currently experiencing any symptoms of urinary incontinence.    RADIOLOGY (CT AND/OR KUB):    CT Abdomen and Pelvis: No results found for this or any previous visit.     CT Stone Protocol: Results for orders placed during the hospital encounter of 08/16/24    CT Abdomen Pelvis Stone Protocol    Narrative  EXAM:  CT Abdomen and Pelvis Without Intravenous Contrast    EXAM DATE:  8/16/2024 10:13 AM    CLINICAL HISTORY:  stone; N20.1-Calculus of ureter; R10.9-Unspecified abdominal  pain;  R10.31-Right lower quadrant pain    TECHNIQUE:  Axial computed tomography images of the abdomen and pelvis without  intravenous contrast.  Sagittal and coronal reformatted images were  created and reviewed.  This CT exam was performed using one or more of  the following dose reduction techniques:  automated exposure control,  adjustment of the mA and/or kV according to patient size, and/or use of  iterative reconstruction technique.    COMPARISON:  No relevant prior studies available.    FINDINGS:  LUNG BASES:  Unremarkable as visualized.  No mass.  No consolidation.    ABDOMEN:  LIVER:  Unremarkable as visualized.  GALLBLADDER AND BILE DUCTS:  Cholecystectomy clips.  No ductal  dilation.  PANCREAS:  Unremarkable as visualized.  No ductal dilation.  SPLEEN:  Unremarkable as visualized.  No splenomegaly.  ADRENALS:  Unremarkable as visualized.  No mass.  KIDNEYS AND URETERS:  Nonobstructive right renal calculi. Previously  noted right proximal ureteral calculus is no longer identified.  STOMACH AND BOWEL:  Unremarkable as visualized.  No obstruction.  No  mucosal thickening.    PELVIS:  APPENDIX:  No findings to suggest acute appendicitis.  BLADDER:  Unremarkable as visualized.  No stones.  REPRODUCTIVE:  Unremarkable as visualized.    ABDOMEN and PELVIS:  INTRAPERITONEAL SPACE:  Unremarkable as visualized.  No free air.  No  significant fluid collection.  BONES/JOINTS:  No acute fracture.  No dislocation.  SOFT TISSUES:  Unremarkable as visualized.  VASCULATURE:  Unremarkable as visualized.  No abdominal aortic  aneurysm.  LYMPH NODES:  Unremarkable as visualized.  No enlarged lymph nodes.    Impression  Nonobstructive right renal calculi. Previously noted right proximal  ureteral calculus is no longer identified.      This report was finalized on 8/16/2024 10:44 AM by Dr. Alexx Moran MD.     KUB: Results for orders placed during the hospital encounter of 08/16/24    XR Abdomen KUB    Narrative  EXAM:  XR  Abdomen, 1 View    EXAM DATE:  8/16/2024 9:49 AM    CLINICAL HISTORY:  Kidney stone; N20.1-Calculus of ureter; R10.9-Unspecified abdominal  pain; R10.31-Right lower quadrant pain    TECHNIQUE:  Frontal supine view of the abdomen/pelvis.    COMPARISON:  No relevant prior studies available.    FINDINGS:  GASTROINTESTINAL TRACT:  Moderate right colonic stool.  No dilation.  BONES/JOINTS:  Unremarkable as visualized.  No acute fracture.  OTHER FINDINGS:  Previously noted right proximal ureteral calculus is  not definitively visualized today.    Impression  1.  Previously noted right proximal ureteral calculus is not  definitively visualized today.  2.  Moderate right colonic stool.      This report was finalized on 8/16/2024 10:02 AM by Dr. Alexx Moran MD.       [unfilled]  LABS (3 MONTHS):    Office Visit on 08/30/2024   Component Date Value Ref Range Status    Color 08/30/2024 Yellow  Yellow, Straw, Dark Yellow, Kylee Final    Clarity, UA 08/30/2024 Clear  Clear Final    Specific Gravity  08/30/2024 1.025  1.005 - 1.030 Final    pH, Urine 08/30/2024 6.0  5.0 - 8.0 Final    Leukocytes 08/30/2024 Trace (A)  Negative Final    Nitrite, UA 08/30/2024 Negative  Negative Final    Protein, POC 08/30/2024 Negative  Negative mg/dL Final    Glucose, UA 08/30/2024 Negative  Negative mg/dL Final    Ketones, UA 08/30/2024 Negative  Negative Final    Urobilinogen, UA 08/30/2024 Normal  Normal, 0.2 E.U./dL Final    Bilirubin 08/30/2024 Negative  Negative Final    Blood, UA 08/30/2024 Negative  Negative Final    Lot Number 08/30/2024 [id number]   Final    Expiration Date 08/30/2024 102,024   Final    Urine Culture 08/30/2024 No growth   Final   Pre-Admission Testing on 08/14/2024   Component Date Value Ref Range Status    Glucose 08/14/2024 90  65 - 99 mg/dL Final    BUN 08/14/2024 12  6 - 20 mg/dL Final    Creatinine 08/14/2024 0.87  0.57 - 1.00 mg/dL Final    Sodium 08/14/2024 144  136 - 145 mmol/L Final    Potassium  08/14/2024 3.7  3.5 - 5.2 mmol/L Final    Chloride 08/14/2024 109 (H)  98 - 107 mmol/L Final    CO2 08/14/2024 23.2  22.0 - 29.0 mmol/L Final    Calcium 08/14/2024 9.4  8.6 - 10.5 mg/dL Final    BUN/Creatinine Ratio 08/14/2024 13.8  7.0 - 25.0 Final    Anion Gap 08/14/2024 11.8  5.0 - 15.0 mmol/L Final    eGFR 08/14/2024 84.4  >60.0 mL/min/1.73 Final    WBC 08/14/2024 7.44  3.40 - 10.80 10*3/mm3 Final    RBC 08/14/2024 4.38  3.77 - 5.28 10*6/mm3 Final    Hemoglobin 08/14/2024 12.5  12.0 - 15.9 g/dL Final    Hematocrit 08/14/2024 39.6  34.0 - 46.6 % Final    MCV 08/14/2024 90.4  79.0 - 97.0 fL Final    MCH 08/14/2024 28.5  26.6 - 33.0 pg Final    MCHC 08/14/2024 31.6  31.5 - 35.7 g/dL Final    RDW 08/14/2024 13.6  12.3 - 15.4 % Final    RDW-SD 08/14/2024 44.8  37.0 - 54.0 fl Final    MPV 08/14/2024 11.7  6.0 - 12.0 fL Final    Platelets 08/14/2024 219  140 - 450 10*3/mm3 Final   Office Visit on 08/08/2024   Component Date Value Ref Range Status    Color 08/08/2024 Yellow  Yellow, Straw, Dark Yellow, Kylee Final    Clarity, UA 08/08/2024 Clear  Clear Final    Specific Gravity  08/08/2024 1.020  1.005 - 1.030 Final    pH, Urine 08/08/2024 5.5  5.0 - 8.0 Final    Leukocytes 08/08/2024 Negative  Negative Final    Nitrite, UA 08/08/2024 Negative  Negative Final    Protein, POC 08/08/2024 Negative  Negative mg/dL Final    Glucose, UA 08/08/2024 Negative  Negative mg/dL Final    Ketones, UA 08/08/2024 Negative  Negative Final    Urobilinogen, UA 08/08/2024 Normal  Normal, 0.2 E.U./dL Final    Bilirubin 08/08/2024 Negative  Negative Final    Blood, UA 08/08/2024 Negative  Negative Final    Lot Number 08/08/2024 [id number]   Final    Expiration Date 08/08/2024 10/25/2024   Final        Follow Up   Return if symptoms worsen or fail to improve, for Next scheduled follow up  for kidney stones/flank pain/ibs-c.    Patient was given instructions and counseling regarding her condition or for health maintenance advice. Please  see specific information pulled into the AVS if appropriate.          This document has been electronically signed by Griselda Cheng-Akwa, APRN   August 31, 2024 14:05 EDT      Dictated Utilizing Dragon Dictation: Part of this note may be an electronic transcription/translation of spoken language to printed text using the Dragon Dictation System.      Patient or patient representative verbalized consent for the use of Ambient Listening during the visit with  Griselda Cheng-Akwa, APRN for chart documentation. 8/31/2024  10:33 EDT

## 2024-08-31 LAB — BACTERIA SPEC AEROBE CULT: NO GROWTH

## 2024-09-17 ENCOUNTER — TRANSCRIBE ORDERS (OUTPATIENT)
Dept: ADMINISTRATIVE | Facility: HOSPITAL | Age: 44
End: 2024-09-17
Payer: COMMERCIAL

## 2024-09-17 DIAGNOSIS — H05.20 EXOPHTHALMUS: ICD-10-CM

## 2024-09-17 DIAGNOSIS — G43.909 ACUTE MIGRAINE: Primary | ICD-10-CM

## 2024-09-17 DIAGNOSIS — H57.11 PAIN IN RIGHT EYE: ICD-10-CM

## 2024-09-26 ENCOUNTER — HOSPITAL ENCOUNTER (OUTPATIENT)
Dept: CT IMAGING | Facility: HOSPITAL | Age: 44
Discharge: HOME | End: 2024-09-26
Admitting: PHYSICIAN ASSISTANT
Payer: COMMERCIAL

## 2024-09-26 DIAGNOSIS — G43.909 ACUTE MIGRAINE: ICD-10-CM

## 2024-09-26 DIAGNOSIS — H05.20 EXOPHTHALMUS: ICD-10-CM

## 2024-09-26 DIAGNOSIS — H57.11 PAIN IN RIGHT EYE: ICD-10-CM

## 2024-09-26 PROCEDURE — 70486 CT MAXILLOFACIAL W/O DYE: CPT

## 2025-04-25 ENCOUNTER — TRANSCRIBE ORDERS (OUTPATIENT)
Dept: ADMINISTRATIVE | Facility: HOSPITAL | Age: 45
End: 2025-04-25
Payer: COMMERCIAL

## 2025-04-25 DIAGNOSIS — M54.41 ACUTE BACK PAIN WITH SCIATICA, RIGHT: Primary | ICD-10-CM

## 2025-05-09 ENCOUNTER — HOSPITAL ENCOUNTER (OUTPATIENT)
Dept: MRI IMAGING | Facility: HOSPITAL | Age: 45
Discharge: HOME | End: 2025-05-09
Admitting: PHYSICIAN ASSISTANT
Payer: COMMERCIAL

## 2025-05-09 DIAGNOSIS — M54.41 ACUTE BACK PAIN WITH SCIATICA, RIGHT: ICD-10-CM

## 2025-05-09 PROCEDURE — 72148 MRI LUMBAR SPINE W/O DYE: CPT

## 2025-06-03 ENCOUNTER — TRANSCRIBE ORDERS (OUTPATIENT)
Dept: ADMINISTRATIVE | Facility: HOSPITAL | Age: 45
End: 2025-06-03
Payer: COMMERCIAL

## 2025-06-03 DIAGNOSIS — R29.90 ABNORMAL NEUROLOGICAL FINDINGS: Primary | ICD-10-CM

## 2025-06-10 ENCOUNTER — HOSPITAL ENCOUNTER (OUTPATIENT)
Dept: MRI IMAGING | Facility: HOSPITAL | Age: 45
Discharge: HOME | End: 2025-06-10
Admitting: PSYCHIATRY & NEUROLOGY
Payer: COMMERCIAL

## 2025-06-10 DIAGNOSIS — R29.90 ABNORMAL NEUROLOGICAL FINDINGS: ICD-10-CM

## 2025-06-10 PROCEDURE — 70551 MRI BRAIN STEM W/O DYE: CPT
